# Patient Record
Sex: FEMALE | Race: WHITE | Employment: OTHER | URBAN - METROPOLITAN AREA
[De-identification: names, ages, dates, MRNs, and addresses within clinical notes are randomized per-mention and may not be internally consistent; named-entity substitution may affect disease eponyms.]

---

## 2019-01-01 ENCOUNTER — APPOINTMENT (OUTPATIENT)
Dept: ULTRASOUND IMAGING | Age: 74
DRG: 641 | End: 2019-01-01
Payer: MEDICARE

## 2019-01-01 ENCOUNTER — APPOINTMENT (OUTPATIENT)
Dept: CT IMAGING | Age: 74
DRG: 641 | End: 2019-01-01
Payer: MEDICARE

## 2019-01-01 ENCOUNTER — HOSPITAL ENCOUNTER (INPATIENT)
Age: 74
LOS: 5 days | Discharge: HOME HEALTH | DRG: 641 | End: 2019-05-09
Attending: EMERGENCY MEDICINE | Admitting: INTERNAL MEDICINE
Payer: MEDICARE

## 2019-01-01 ENCOUNTER — APPOINTMENT (OUTPATIENT)
Dept: VASCULAR LAB | Age: 74
DRG: 641 | End: 2019-01-01
Payer: MEDICARE

## 2019-01-01 ENCOUNTER — CARE COORDINATION (OUTPATIENT)
Dept: CASE MANAGEMENT | Age: 74
End: 2019-01-01

## 2019-01-01 ENCOUNTER — APPOINTMENT (OUTPATIENT)
Dept: GENERAL RADIOLOGY | Age: 74
DRG: 641 | End: 2019-01-01
Payer: MEDICARE

## 2019-01-01 VITALS
RESPIRATION RATE: 20 BRPM | HEIGHT: 64 IN | BODY MASS INDEX: 24.01 KG/M2 | TEMPERATURE: 98.2 F | HEART RATE: 96 BPM | OXYGEN SATURATION: 95 % | WEIGHT: 140.65 LBS | SYSTOLIC BLOOD PRESSURE: 160 MMHG | DIASTOLIC BLOOD PRESSURE: 71 MMHG

## 2019-01-01 DIAGNOSIS — R06.02 SOB (SHORTNESS OF BREATH): Primary | ICD-10-CM

## 2019-01-01 DIAGNOSIS — E87.70 HYPERVOLEMIA, UNSPECIFIED HYPERVOLEMIA TYPE: ICD-10-CM

## 2019-01-01 DIAGNOSIS — R19.5 HEME POSITIVE STOOL: ICD-10-CM

## 2019-01-01 LAB
ALBUMIN SERPL-MCNC: 3.3 G/DL (ref 3.1–4.9)
ALBUMIN SERPL-MCNC: 3.3 G/DL (ref 3.4–5)
ALBUMIN SERPL-MCNC: 3.5 G/DL (ref 3.4–5)
ALBUMIN SERPL-MCNC: 3.5 G/DL (ref 3.4–5)
ALBUMIN SERPL-MCNC: 3.7 G/DL (ref 3.4–5)
ALP BLD-CCNC: 72 U/L (ref 40–129)
ALPHA-1-GLOBULIN: 0.3 G/DL (ref 0.2–0.4)
ALPHA-2-GLOBULIN: 1 G/DL (ref 0.4–1.1)
ALT SERPL-CCNC: 14 U/L (ref 10–40)
ANION GAP SERPL CALCULATED.3IONS-SCNC: 13 MMOL/L (ref 3–16)
ANION GAP SERPL CALCULATED.3IONS-SCNC: 13 MMOL/L (ref 3–16)
ANION GAP SERPL CALCULATED.3IONS-SCNC: 14 MMOL/L (ref 3–16)
ANION GAP SERPL CALCULATED.3IONS-SCNC: 14 MMOL/L (ref 3–16)
ANION GAP SERPL CALCULATED.3IONS-SCNC: 15 MMOL/L (ref 3–16)
AST SERPL-CCNC: 22 U/L (ref 15–37)
BACTERIA: ABNORMAL /HPF
BASE EXCESS VENOUS: -7 (ref -3–3)
BASOPHILS ABSOLUTE: 0 K/UL (ref 0–0.2)
BASOPHILS ABSOLUTE: 0.1 K/UL (ref 0–0.2)
BASOPHILS ABSOLUTE: 0.1 K/UL (ref 0–0.2)
BASOPHILS RELATIVE PERCENT: 0.4 %
BASOPHILS RELATIVE PERCENT: 0.5 %
BASOPHILS RELATIVE PERCENT: 0.5 %
BASOPHILS RELATIVE PERCENT: 0.6 %
BASOPHILS RELATIVE PERCENT: 0.6 %
BETA GLOBULIN: 1.1 G/DL (ref 0.9–1.6)
BILIRUB SERPL-MCNC: <0.2 MG/DL (ref 0–1)
BILIRUBIN DIRECT: <0.2 MG/DL (ref 0–0.3)
BILIRUBIN URINE: NEGATIVE
BILIRUBIN, INDIRECT: ABNORMAL MG/DL (ref 0–1)
BLOOD, URINE: ABNORMAL
BUN BLDV-MCNC: 23 MG/DL (ref 7–20)
BUN BLDV-MCNC: 25 MG/DL (ref 7–20)
BUN BLDV-MCNC: 26 MG/DL (ref 7–20)
BUN BLDV-MCNC: 27 MG/DL (ref 7–20)
BUN BLDV-MCNC: 28 MG/DL (ref 7–20)
C DIFFICILE TOXIN, EIA: NORMAL
CALCIUM SERPL-MCNC: 9 MG/DL (ref 8.3–10.6)
CALCIUM SERPL-MCNC: 9.2 MG/DL (ref 8.3–10.6)
CALCIUM SERPL-MCNC: 9.2 MG/DL (ref 8.3–10.6)
CALCIUM SERPL-MCNC: 9.5 MG/DL (ref 8.3–10.6)
CALCIUM SERPL-MCNC: 9.7 MG/DL (ref 8.3–10.6)
CHLORIDE BLD-SCNC: 107 MMOL/L (ref 99–110)
CHLORIDE BLD-SCNC: 108 MMOL/L (ref 99–110)
CHLORIDE BLD-SCNC: 108 MMOL/L (ref 99–110)
CHLORIDE BLD-SCNC: 109 MMOL/L (ref 99–110)
CHLORIDE BLD-SCNC: 109 MMOL/L (ref 99–110)
CHOLESTEROL, TOTAL: 153 MG/DL (ref 0–199)
CLARITY: CLEAR
CO2: 18 MMOL/L (ref 21–32)
CO2: 20 MMOL/L (ref 21–32)
CO2: 21 MMOL/L (ref 21–32)
CO2: 22 MMOL/L (ref 21–32)
CO2: 22 MMOL/L (ref 21–32)
COLOR: YELLOW
CORTISOL TOTAL: 17.4 UG/DL
CREAT SERPL-MCNC: 1.9 MG/DL (ref 0.6–1.2)
CREAT SERPL-MCNC: 2 MG/DL (ref 0.6–1.2)
CREATININE URINE: 90.7 MG/DL (ref 28–259)
EKG ATRIAL RATE: 85 BPM
EKG DIAGNOSIS: NORMAL
EKG P AXIS: 65 DEGREES
EKG P-R INTERVAL: 146 MS
EKG Q-T INTERVAL: 378 MS
EKG QRS DURATION: 82 MS
EKG QTC CALCULATION (BAZETT): 449 MS
EKG R AXIS: 60 DEGREES
EKG T AXIS: 23 DEGREES
EKG VENTRICULAR RATE: 85 BPM
EOSINOPHILS ABSOLUTE: 0.1 K/UL (ref 0–0.6)
EOSINOPHILS ABSOLUTE: 0.2 K/UL (ref 0–0.6)
EOSINOPHILS ABSOLUTE: 0.3 K/UL (ref 0–0.6)
EOSINOPHILS RELATIVE PERCENT: 0.8 %
EOSINOPHILS RELATIVE PERCENT: 1.2 %
EOSINOPHILS RELATIVE PERCENT: 1.8 %
EOSINOPHILS RELATIVE PERCENT: 1.8 %
EOSINOPHILS RELATIVE PERCENT: 3.3 %
EPITHELIAL CELLS, UA: ABNORMAL /HPF
FERRITIN: 63.2 NG/ML (ref 15–150)
GAMMA GLOBULIN: 0.4 G/DL (ref 0.6–1.8)
GFR AFRICAN AMERICAN: 29
GFR AFRICAN AMERICAN: 31
GFR NON-AFRICAN AMERICAN: 24
GFR NON-AFRICAN AMERICAN: 26
GLUCOSE BLD-MCNC: 102 MG/DL (ref 70–99)
GLUCOSE BLD-MCNC: 103 MG/DL (ref 70–99)
GLUCOSE BLD-MCNC: 108 MG/DL (ref 70–99)
GLUCOSE BLD-MCNC: 137 MG/DL (ref 70–99)
GLUCOSE BLD-MCNC: 97 MG/DL (ref 70–99)
GLUCOSE URINE: 100 MG/DL
HCO3 VENOUS: 18.2 MMOL/L (ref 23–29)
HCT VFR BLD CALC: 25.6 % (ref 36–48)
HCT VFR BLD CALC: 26.6 % (ref 36–48)
HCT VFR BLD CALC: 26.7 % (ref 36–48)
HCT VFR BLD CALC: 29.4 % (ref 36–48)
HCT VFR BLD CALC: 30.2 % (ref 36–48)
HCT VFR BLD CALC: 35.2 %
HDLC SERPL-MCNC: 57 MG/DL (ref 40–60)
HEMOGLOBIN: 8.4 G/DL (ref 12–16)
HEMOGLOBIN: 8.7 G/DL (ref 12–16)
HEMOGLOBIN: 8.7 G/DL (ref 12–16)
HEMOGLOBIN: 9.5 G/DL (ref 12–16)
HEMOGLOBIN: 9.8 G/DL (ref 12–16)
IRON SATURATION: 25 % (ref 15–50)
IRON: 64 UG/DL (ref 37–145)
KETONES, URINE: NEGATIVE MG/DL
LACTATE: 0.78 MMOL/L (ref 0.4–2)
LDL CHOLESTEROL CALCULATED: 69 MG/DL
LEUKOCYTE ESTERASE, URINE: NEGATIVE
LIPASE: 34 U/L (ref 13–60)
LV EF: 58 %
LVEF MODALITY: NORMAL
LYMPHOCYTES ABSOLUTE: 1.3 K/UL (ref 1–5.1)
LYMPHOCYTES ABSOLUTE: 1.8 K/UL (ref 1–5.1)
LYMPHOCYTES ABSOLUTE: 2 K/UL (ref 1–5.1)
LYMPHOCYTES ABSOLUTE: 2.2 K/UL (ref 1–5.1)
LYMPHOCYTES ABSOLUTE: 2.7 K/UL (ref 1–5.1)
LYMPHOCYTES RELATIVE PERCENT: 11.8 %
LYMPHOCYTES RELATIVE PERCENT: 20.5 %
LYMPHOCYTES RELATIVE PERCENT: 21.5 %
LYMPHOCYTES RELATIVE PERCENT: 24.2 %
LYMPHOCYTES RELATIVE PERCENT: 25.7 %
MAGNESIUM: 1.8 MG/DL (ref 1.8–2.4)
MAGNESIUM: 1.8 MG/DL (ref 1.8–2.4)
MAGNESIUM: 2 MG/DL (ref 1.8–2.4)
MCH RBC QN AUTO: 32 PG (ref 26–34)
MCH RBC QN AUTO: 32.1 PG (ref 26–34)
MCH RBC QN AUTO: 32.4 PG (ref 26–34)
MCH RBC QN AUTO: 32.6 PG (ref 26–34)
MCH RBC QN AUTO: 32.6 PG (ref 26–34)
MCHC RBC AUTO-ENTMCNC: 32.3 G/DL (ref 31–36)
MCHC RBC AUTO-ENTMCNC: 32.3 G/DL (ref 31–36)
MCHC RBC AUTO-ENTMCNC: 32.5 G/DL (ref 31–36)
MCHC RBC AUTO-ENTMCNC: 32.7 G/DL (ref 31–36)
MCHC RBC AUTO-ENTMCNC: 32.8 G/DL (ref 31–36)
MCV RBC AUTO: 101 FL (ref 80–100)
MCV RBC AUTO: 98.1 FL (ref 80–100)
MCV RBC AUTO: 99.3 FL (ref 80–100)
MCV RBC AUTO: 99.4 FL (ref 80–100)
MCV RBC AUTO: 99.5 FL (ref 80–100)
MICROSCOPIC EXAMINATION: YES
MONOCYTES ABSOLUTE: 0.4 K/UL (ref 0–1.3)
MONOCYTES ABSOLUTE: 0.8 K/UL (ref 0–1.3)
MONOCYTES ABSOLUTE: 0.8 K/UL (ref 0–1.3)
MONOCYTES ABSOLUTE: 0.9 K/UL (ref 0–1.3)
MONOCYTES ABSOLUTE: 1 K/UL (ref 0–1.3)
MONOCYTES RELATIVE PERCENT: 10.2 %
MONOCYTES RELATIVE PERCENT: 11.4 %
MONOCYTES RELATIVE PERCENT: 4 %
MONOCYTES RELATIVE PERCENT: 7.9 %
MONOCYTES RELATIVE PERCENT: 8.5 %
NEUTROPHILS ABSOLUTE: 4.6 K/UL (ref 1.7–7.7)
NEUTROPHILS ABSOLUTE: 4.7 K/UL (ref 1.7–7.7)
NEUTROPHILS ABSOLUTE: 6.8 K/UL (ref 1.7–7.7)
NEUTROPHILS ABSOLUTE: 9.1 K/UL (ref 1.7–7.7)
NEUTROPHILS ABSOLUTE: 9.1 K/UL (ref 1.7–7.7)
NEUTROPHILS RELATIVE PERCENT: 61.7 %
NEUTROPHILS RELATIVE PERCENT: 62.7 %
NEUTROPHILS RELATIVE PERCENT: 66.1 %
NEUTROPHILS RELATIVE PERCENT: 69.3 %
NEUTROPHILS RELATIVE PERCENT: 83 %
NITRITE, URINE: NEGATIVE
O2 SAT, VEN: 90 %
PCO2, VEN: 30.8 MM HG (ref 40–50)
PDW BLD-RTO: 13.9 % (ref 12.4–15.4)
PDW BLD-RTO: 14 % (ref 12.4–15.4)
PDW BLD-RTO: 14.1 % (ref 12.4–15.4)
PDW BLD-RTO: 14.2 % (ref 12.4–15.4)
PDW BLD-RTO: 14.5 % (ref 12.4–15.4)
PERFORMED ON: ABNORMAL
PH UA: 6.5 (ref 5–8)
PH VENOUS: 7.38 (ref 7.35–7.45)
PHOSPHORUS: 2.3 MG/DL (ref 2.5–4.9)
PHOSPHORUS: 2.9 MG/DL (ref 2.5–4.9)
PHOSPHORUS: 3.5 MG/DL (ref 2.5–4.9)
PLATELET # BLD: 221 K/UL (ref 135–450)
PLATELET # BLD: 232 K/UL (ref 135–450)
PLATELET # BLD: 245 K/UL (ref 135–450)
PLATELET # BLD: 279 K/UL (ref 135–450)
PLATELET # BLD: 279 K/UL (ref 135–450)
PMV BLD AUTO: 7.7 FL (ref 5–10.5)
PMV BLD AUTO: 7.8 FL (ref 5–10.5)
PMV BLD AUTO: 7.9 FL (ref 5–10.5)
PMV BLD AUTO: 8 FL (ref 5–10.5)
PMV BLD AUTO: 8.3 FL (ref 5–10.5)
PO2, VEN: 59 MM HG
POC OCCULT BLOOD STOOL: POSITIVE
POC SAMPLE TYPE: ABNORMAL
POTASSIUM REFLEX MAGNESIUM: 3.3 MMOL/L (ref 3.5–5.1)
POTASSIUM SERPL-SCNC: 3 MMOL/L (ref 3.5–5.1)
POTASSIUM SERPL-SCNC: 3.2 MMOL/L (ref 3.5–5.1)
POTASSIUM SERPL-SCNC: 3.3 MMOL/L (ref 3.5–5.1)
POTASSIUM SERPL-SCNC: 3.6 MMOL/L (ref 3.5–5.1)
POTASSIUM SERPL-SCNC: 4.1 MMOL/L (ref 3.5–5.1)
PRO-BNP: 925 PG/ML (ref 0–449)
PROTEIN PROTEIN: 92.4 MG/DL
PROTEIN UA: 100 MG/DL
RAPID INFLUENZA  B AGN: NEGATIVE
RAPID INFLUENZA A AGN: NEGATIVE
RBC # BLD: 2.61 M/UL (ref 4–5.2)
RBC # BLD: 2.68 M/UL (ref 4–5.2)
RBC # BLD: 2.68 M/UL (ref 4–5.2)
RBC # BLD: 2.95 M/UL (ref 4–5.2)
RBC # BLD: 2.99 M/UL (ref 4–5.2)
RBC FOLATE: 601 NG/ML
RBC UA: ABNORMAL /HPF (ref 0–2)
SODIUM BLD-SCNC: 141 MMOL/L (ref 136–145)
SODIUM BLD-SCNC: 142 MMOL/L (ref 136–145)
SODIUM BLD-SCNC: 143 MMOL/L (ref 136–145)
SODIUM BLD-SCNC: 143 MMOL/L (ref 136–145)
SODIUM BLD-SCNC: 144 MMOL/L (ref 136–145)
SPE/IFE INTERPRETATION: NORMAL
SPECIFIC GRAVITY UA: 1.02 (ref 1–1.03)
TCO2 CALC VENOUS: 19 MMOL/L
TOTAL IRON BINDING CAPACITY: 257 UG/DL (ref 260–445)
TOTAL PROTEIN: 5.9 G/DL (ref 6.4–8.2)
TOTAL PROTEIN: 6.1 G/DL (ref 6.4–8.2)
TRANSFERRIN: 207 MG/DL (ref 200–360)
TRIGL SERPL-MCNC: 135 MG/DL (ref 0–150)
TROPONIN: 0.01 NG/ML
URINE REFLEX TO CULTURE: ABNORMAL
URINE TYPE: ABNORMAL
UROBILINOGEN, URINE: 0.2 E.U./DL
VITAMIN B-12: 880 PG/ML (ref 211–911)
VLDLC SERPL CALC-MCNC: 27 MG/DL
WBC # BLD: 10.2 K/UL (ref 4–11)
WBC # BLD: 11 K/UL (ref 4–11)
WBC # BLD: 13.1 K/UL (ref 4–11)
WBC # BLD: 7.4 K/UL (ref 4–11)
WBC # BLD: 7.7 K/UL (ref 4–11)
WBC UA: ABNORMAL /HPF (ref 0–5)

## 2019-01-01 PROCEDURE — 83690 ASSAY OF LIPASE: CPT

## 2019-01-01 PROCEDURE — 6370000000 HC RX 637 (ALT 250 FOR IP): Performed by: INTERNAL MEDICINE

## 2019-01-01 PROCEDURE — 84165 PROTEIN E-PHORESIS SERUM: CPT

## 2019-01-01 PROCEDURE — 36415 COLL VENOUS BLD VENIPUNCTURE: CPT

## 2019-01-01 PROCEDURE — 87449 NOS EACH ORGANISM AG IA: CPT

## 2019-01-01 PROCEDURE — 6370000000 HC RX 637 (ALT 250 FOR IP)

## 2019-01-01 PROCEDURE — 83735 ASSAY OF MAGNESIUM: CPT

## 2019-01-01 PROCEDURE — 94761 N-INVAS EAR/PLS OXIMETRY MLT: CPT

## 2019-01-01 PROCEDURE — 6370000000 HC RX 637 (ALT 250 FOR IP): Performed by: PHYSICIAN ASSISTANT

## 2019-01-01 PROCEDURE — 83605 ASSAY OF LACTIC ACID: CPT

## 2019-01-01 PROCEDURE — 99222 1ST HOSP IP/OBS MODERATE 55: CPT | Performed by: INTERNAL MEDICINE

## 2019-01-01 PROCEDURE — 97116 GAIT TRAINING THERAPY: CPT

## 2019-01-01 PROCEDURE — 6360000002 HC RX W HCPCS: Performed by: INTERNAL MEDICINE

## 2019-01-01 PROCEDURE — 71045 X-RAY EXAM CHEST 1 VIEW: CPT

## 2019-01-01 PROCEDURE — 85025 COMPLETE CBC W/AUTO DIFF WBC: CPT

## 2019-01-01 PROCEDURE — 89220 SPUTUM SPECIMEN COLLECTION: CPT

## 2019-01-01 PROCEDURE — 99285 EMERGENCY DEPT VISIT HI MDM: CPT

## 2019-01-01 PROCEDURE — 84132 ASSAY OF SERUM POTASSIUM: CPT

## 2019-01-01 PROCEDURE — 82803 BLOOD GASES ANY COMBINATION: CPT

## 2019-01-01 PROCEDURE — 70450 CT HEAD/BRAIN W/O DYE: CPT

## 2019-01-01 PROCEDURE — 1200000000 HC SEMI PRIVATE

## 2019-01-01 PROCEDURE — 6360000002 HC RX W HCPCS: Performed by: STUDENT IN AN ORGANIZED HEALTH CARE EDUCATION/TRAINING PROGRAM

## 2019-01-01 PROCEDURE — 76770 US EXAM ABDO BACK WALL COMP: CPT

## 2019-01-01 PROCEDURE — 94664 DEMO&/EVAL PT USE INHALER: CPT

## 2019-01-01 PROCEDURE — 94640 AIRWAY INHALATION TREATMENT: CPT

## 2019-01-01 PROCEDURE — 2580000003 HC RX 258: Performed by: INTERNAL MEDICINE

## 2019-01-01 PROCEDURE — 93306 TTE W/DOPPLER COMPLETE: CPT

## 2019-01-01 PROCEDURE — 87804 INFLUENZA ASSAY W/OPTIC: CPT

## 2019-01-01 PROCEDURE — 93005 ELECTROCARDIOGRAM TRACING: CPT | Performed by: PHYSICIAN ASSISTANT

## 2019-01-01 PROCEDURE — 83540 ASSAY OF IRON: CPT

## 2019-01-01 PROCEDURE — 97535 SELF CARE MNGMENT TRAINING: CPT

## 2019-01-01 PROCEDURE — 80069 RENAL FUNCTION PANEL: CPT

## 2019-01-01 PROCEDURE — 1111F DSCHRG MED/CURRENT MED MERGE: CPT | Performed by: INTERNAL MEDICINE

## 2019-01-01 PROCEDURE — 84156 ASSAY OF PROTEIN URINE: CPT

## 2019-01-01 PROCEDURE — 84466 ASSAY OF TRANSFERRIN: CPT

## 2019-01-01 PROCEDURE — 97530 THERAPEUTIC ACTIVITIES: CPT

## 2019-01-01 PROCEDURE — 82272 OCCULT BLD FECES 1-3 TESTS: CPT

## 2019-01-01 PROCEDURE — 80061 LIPID PANEL: CPT

## 2019-01-01 PROCEDURE — 81001 URINALYSIS AUTO W/SCOPE: CPT

## 2019-01-01 PROCEDURE — 97162 PT EVAL MOD COMPLEX 30 MIN: CPT

## 2019-01-01 PROCEDURE — 83880 ASSAY OF NATRIURETIC PEPTIDE: CPT

## 2019-01-01 PROCEDURE — 74150 CT ABDOMEN W/O CONTRAST: CPT

## 2019-01-01 PROCEDURE — 6370000000 HC RX 637 (ALT 250 FOR IP): Performed by: STUDENT IN AN ORGANIZED HEALTH CARE EDUCATION/TRAINING PROGRAM

## 2019-01-01 PROCEDURE — 82728 ASSAY OF FERRITIN: CPT

## 2019-01-01 PROCEDURE — 93970 EXTREMITY STUDY: CPT

## 2019-01-01 PROCEDURE — 84484 ASSAY OF TROPONIN QUANT: CPT

## 2019-01-01 PROCEDURE — 51798 US URINE CAPACITY MEASURE: CPT

## 2019-01-01 PROCEDURE — 82607 VITAMIN B-12: CPT

## 2019-01-01 PROCEDURE — 97165 OT EVAL LOW COMPLEX 30 MIN: CPT

## 2019-01-01 PROCEDURE — 80048 BASIC METABOLIC PNL TOTAL CA: CPT

## 2019-01-01 PROCEDURE — 82747 ASSAY OF FOLIC ACID RBC: CPT

## 2019-01-01 PROCEDURE — 87324 CLOSTRIDIUM AG IA: CPT

## 2019-01-01 PROCEDURE — 82570 ASSAY OF URINE CREATININE: CPT

## 2019-01-01 PROCEDURE — 99232 SBSQ HOSP IP/OBS MODERATE 35: CPT | Performed by: INTERNAL MEDICINE

## 2019-01-01 PROCEDURE — 84155 ASSAY OF PROTEIN SERUM: CPT

## 2019-01-01 PROCEDURE — 80076 HEPATIC FUNCTION PANEL: CPT

## 2019-01-01 PROCEDURE — 82533 TOTAL CORTISOL: CPT

## 2019-01-01 RX ORDER — NIFEDIPINE 30 MG/1
30 TABLET, FILM COATED, EXTENDED RELEASE ORAL DAILY
Status: DISCONTINUED | OUTPATIENT
Start: 2019-01-01 | End: 2019-01-01

## 2019-01-01 RX ORDER — ACETAMINOPHEN 500 MG
1000 TABLET ORAL ONCE
Status: COMPLETED | OUTPATIENT
Start: 2019-01-01 | End: 2019-01-01

## 2019-01-01 RX ORDER — CHLORTHALIDONE 25 MG/1
25 TABLET ORAL DAILY
Qty: 30 TABLET | Refills: 3 | Status: SHIPPED | OUTPATIENT
Start: 2019-01-01

## 2019-01-01 RX ORDER — POTASSIUM CHLORIDE 750 MG/1
10 TABLET, EXTENDED RELEASE ORAL 2 TIMES DAILY
Status: DISCONTINUED | OUTPATIENT
Start: 2019-01-01 | End: 2019-01-01 | Stop reason: HOSPADM

## 2019-01-01 RX ORDER — SODIUM CHLORIDE 0.9 % (FLUSH) 0.9 %
10 SYRINGE (ML) INJECTION EVERY 12 HOURS SCHEDULED
Status: DISCONTINUED | OUTPATIENT
Start: 2019-01-01 | End: 2019-01-01 | Stop reason: HOSPADM

## 2019-01-01 RX ORDER — LEVOTHYROXINE SODIUM 112 UG/1
112 TABLET ORAL DAILY
COMMUNITY
Start: 2019-01-01

## 2019-01-01 RX ORDER — NIFEDIPINE 30 MG/1
30 TABLET, FILM COATED, EXTENDED RELEASE ORAL ONCE
Status: DISCONTINUED | OUTPATIENT
Start: 2019-01-01 | End: 2019-01-01 | Stop reason: HOSPADM

## 2019-01-01 RX ORDER — FERROUS SULFATE 325(65) MG
325 TABLET ORAL
Qty: 30 TABLET | Refills: 3 | Status: SHIPPED | OUTPATIENT
Start: 2019-01-01

## 2019-01-01 RX ORDER — NIFEDIPINE 60 MG/1
60 TABLET, FILM COATED, EXTENDED RELEASE ORAL 2 TIMES DAILY
Qty: 30 TABLET | Refills: 3 | Status: SHIPPED | OUTPATIENT
Start: 2019-01-01

## 2019-01-01 RX ORDER — ONDANSETRON 2 MG/ML
4 INJECTION INTRAMUSCULAR; INTRAVENOUS EVERY 6 HOURS PRN
Status: DISCONTINUED | OUTPATIENT
Start: 2019-01-01 | End: 2019-01-01 | Stop reason: HOSPADM

## 2019-01-01 RX ORDER — FUROSEMIDE 40 MG/1
40 TABLET ORAL DAILY
Qty: 60 TABLET | Refills: 3 | Status: SHIPPED | OUTPATIENT
Start: 2019-01-01

## 2019-01-01 RX ORDER — FUROSEMIDE 40 MG/1
40 TABLET ORAL 2 TIMES DAILY
Status: DISCONTINUED | OUTPATIENT
Start: 2019-01-01 | End: 2019-01-01 | Stop reason: HOSPADM

## 2019-01-01 RX ORDER — ROSUVASTATIN CALCIUM 10 MG/1
10 TABLET, COATED ORAL NIGHTLY
Status: DISCONTINUED | OUTPATIENT
Start: 2019-01-01 | End: 2019-01-01 | Stop reason: HOSPADM

## 2019-01-01 RX ORDER — BUDESONIDE 0.5 MG/2ML
0.5 INHALANT ORAL 2 TIMES DAILY
Status: DISCONTINUED | OUTPATIENT
Start: 2019-01-01 | End: 2019-01-01 | Stop reason: HOSPADM

## 2019-01-01 RX ORDER — NIFEDIPINE 30 MG/1
30 TABLET, FILM COATED, EXTENDED RELEASE ORAL
Status: ON HOLD | COMMUNITY
Start: 2018-03-06 | End: 2019-01-01 | Stop reason: HOSPADM

## 2019-01-01 RX ORDER — SODIUM CHLORIDE 0.9 % (FLUSH) 0.9 %
10 SYRINGE (ML) INJECTION PRN
Status: DISCONTINUED | OUTPATIENT
Start: 2019-01-01 | End: 2019-01-01 | Stop reason: HOSPADM

## 2019-01-01 RX ORDER — IPRATROPIUM BROMIDE AND ALBUTEROL SULFATE 2.5; .5 MG/3ML; MG/3ML
SOLUTION RESPIRATORY (INHALATION)
Status: DISPENSED
Start: 2019-01-01 | End: 2019-01-01

## 2019-01-01 RX ORDER — FUROSEMIDE 10 MG/ML
20 INJECTION INTRAMUSCULAR; INTRAVENOUS 2 TIMES DAILY
Status: DISCONTINUED | OUTPATIENT
Start: 2019-01-01 | End: 2019-01-01

## 2019-01-01 RX ORDER — BUTALBITAL, ACETAMINOPHEN AND CAFFEINE 50; 325; 40 MG/1; MG/1; MG/1
1 TABLET ORAL ONCE
Status: COMPLETED | OUTPATIENT
Start: 2019-01-01 | End: 2019-01-01

## 2019-01-01 RX ORDER — CHLORTHALIDONE 25 MG/1
25 TABLET ORAL DAILY
Status: DISCONTINUED | OUTPATIENT
Start: 2019-01-01 | End: 2019-01-01 | Stop reason: HOSPADM

## 2019-01-01 RX ORDER — SERTRALINE HYDROCHLORIDE 100 MG/1
50 TABLET, FILM COATED ORAL DAILY
Status: ON HOLD | COMMUNITY
Start: 2016-05-25 | End: 2019-01-01 | Stop reason: HOSPADM

## 2019-01-01 RX ORDER — NIFEDIPINE 30 MG/1
30 TABLET, FILM COATED, EXTENDED RELEASE ORAL 2 TIMES DAILY
Status: DISCONTINUED | OUTPATIENT
Start: 2019-01-01 | End: 2019-01-01

## 2019-01-01 RX ORDER — LOSARTAN POTASSIUM 100 MG/1
100 TABLET ORAL DAILY
Qty: 30 TABLET | Refills: 3 | Status: SHIPPED | OUTPATIENT
Start: 2019-01-01

## 2019-01-01 RX ORDER — ROSUVASTATIN CALCIUM 10 MG/1
1 TABLET, COATED ORAL NIGHTLY
COMMUNITY
Start: 2016-04-25

## 2019-01-01 RX ORDER — BUDESONIDE AND FORMOTEROL FUMARATE DIHYDRATE 160; 4.5 UG/1; UG/1
2 AEROSOL RESPIRATORY (INHALATION) 2 TIMES DAILY
Qty: 1 INHALER | Refills: 3 | Status: SHIPPED | OUTPATIENT
Start: 2019-01-01

## 2019-01-01 RX ORDER — SENNA PLUS 8.6 MG/1
1 TABLET ORAL DAILY PRN
Status: DISCONTINUED | OUTPATIENT
Start: 2019-01-01 | End: 2019-01-01 | Stop reason: HOSPADM

## 2019-01-01 RX ORDER — POTASSIUM CHLORIDE 750 MG/1
20 TABLET, EXTENDED RELEASE ORAL DAILY PRN
Qty: 60 TABLET | Refills: 3 | Status: SHIPPED | OUTPATIENT
Start: 2019-01-01

## 2019-01-01 RX ORDER — NIFEDIPINE 60 MG/1
60 TABLET, EXTENDED RELEASE ORAL 2 TIMES DAILY
Status: DISCONTINUED | OUTPATIENT
Start: 2019-01-01 | End: 2019-01-01 | Stop reason: HOSPADM

## 2019-01-01 RX ORDER — ACETAMINOPHEN 325 MG/1
650 TABLET ORAL EVERY 4 HOURS PRN
Status: DISCONTINUED | OUTPATIENT
Start: 2019-01-01 | End: 2019-01-01 | Stop reason: HOSPADM

## 2019-01-01 RX ORDER — LEVOTHYROXINE SODIUM 112 UG/1
112 TABLET ORAL DAILY
Status: DISCONTINUED | OUTPATIENT
Start: 2019-01-01 | End: 2019-01-01 | Stop reason: HOSPADM

## 2019-01-01 RX ORDER — IPRATROPIUM BROMIDE AND ALBUTEROL SULFATE 2.5; .5 MG/3ML; MG/3ML
1 SOLUTION RESPIRATORY (INHALATION) 3 TIMES DAILY
Status: DISCONTINUED | OUTPATIENT
Start: 2019-01-01 | End: 2019-01-01 | Stop reason: HOSPADM

## 2019-01-01 RX ORDER — POTASSIUM CHLORIDE 20 MEQ/1
40 TABLET, EXTENDED RELEASE ORAL
Status: COMPLETED | OUTPATIENT
Start: 2019-01-01 | End: 2019-01-01

## 2019-01-01 RX ORDER — FUROSEMIDE 10 MG/ML
20 INJECTION INTRAMUSCULAR; INTRAVENOUS ONCE
Status: COMPLETED | OUTPATIENT
Start: 2019-01-01 | End: 2019-01-01

## 2019-01-01 RX ORDER — IPRATROPIUM BROMIDE AND ALBUTEROL SULFATE 2.5; .5 MG/3ML; MG/3ML
1 SOLUTION RESPIRATORY (INHALATION)
Status: DISCONTINUED | OUTPATIENT
Start: 2019-01-01 | End: 2019-01-01

## 2019-01-01 RX ORDER — LOSARTAN POTASSIUM 100 MG/1
100 TABLET ORAL DAILY
Status: DISCONTINUED | OUTPATIENT
Start: 2019-01-01 | End: 2019-01-01 | Stop reason: HOSPADM

## 2019-01-01 RX ORDER — ALBUTEROL SULFATE 90 UG/1
2 AEROSOL, METERED RESPIRATORY (INHALATION)
COMMUNITY
Start: 2018-04-18

## 2019-01-01 RX ORDER — FORMOTEROL FUMARATE 20 UG/2ML
20 SOLUTION RESPIRATORY (INHALATION) 2 TIMES DAILY
Status: DISCONTINUED | OUTPATIENT
Start: 2019-01-01 | End: 2019-01-01 | Stop reason: HOSPADM

## 2019-01-01 RX ORDER — SENNA PLUS 8.6 MG/1
1 TABLET ORAL DAILY PRN
Qty: 30 TABLET | Refills: 1 | Status: SHIPPED | OUTPATIENT
Start: 2019-01-01 | End: 2019-01-01

## 2019-01-01 RX ADMIN — NIFEDIPINE 60 MG: 60 TABLET, FILM COATED, EXTENDED RELEASE ORAL at 09:40

## 2019-01-01 RX ADMIN — Medication 10 ML: at 09:41

## 2019-01-01 RX ADMIN — FORMOTEROL FUMARATE DIHYDRATE 20 MCG: 20 SOLUTION RESPIRATORY (INHALATION) at 08:52

## 2019-01-01 RX ADMIN — IPRATROPIUM BROMIDE AND ALBUTEROL SULFATE 1 AMPULE: .5; 3 SOLUTION RESPIRATORY (INHALATION) at 20:21

## 2019-01-01 RX ADMIN — IPRATROPIUM BROMIDE AND ALBUTEROL SULFATE 1 AMPULE: .5; 3 SOLUTION RESPIRATORY (INHALATION) at 08:35

## 2019-01-01 RX ADMIN — POTASSIUM CHLORIDE 10 MEQ: 10 TABLET, EXTENDED RELEASE ORAL at 22:06

## 2019-01-01 RX ADMIN — LOSARTAN POTASSIUM 100 MG: 100 TABLET ORAL at 08:54

## 2019-01-01 RX ADMIN — Medication 10 ML: at 21:30

## 2019-01-01 RX ADMIN — NIFEDIPINE 60 MG: 60 TABLET, FILM COATED, EXTENDED RELEASE ORAL at 20:41

## 2019-01-01 RX ADMIN — NIFEDIPINE 60 MG: 60 TABLET, FILM COATED, EXTENDED RELEASE ORAL at 08:19

## 2019-01-01 RX ADMIN — FUROSEMIDE 20 MG: 10 INJECTION, SOLUTION INTRAVENOUS at 20:46

## 2019-01-01 RX ADMIN — ACETAMINOPHEN 650 MG: 325 TABLET ORAL at 04:26

## 2019-01-01 RX ADMIN — ONDANSETRON 4 MG: 2 INJECTION INTRAMUSCULAR; INTRAVENOUS at 21:11

## 2019-01-01 RX ADMIN — NIFEDIPINE 60 MG: 60 TABLET, FILM COATED, EXTENDED RELEASE ORAL at 21:44

## 2019-01-01 RX ADMIN — Medication 10 ML: at 20:49

## 2019-01-01 RX ADMIN — ACETAMINOPHEN 1000 MG: 500 TABLET, FILM COATED ORAL at 17:12

## 2019-01-01 RX ADMIN — POTASSIUM CHLORIDE 10 MEQ: 10 TABLET, EXTENDED RELEASE ORAL at 08:59

## 2019-01-01 RX ADMIN — ACETAMINOPHEN 650 MG: 325 TABLET ORAL at 06:50

## 2019-01-01 RX ADMIN — FORMOTEROL FUMARATE DIHYDRATE 20 MCG: 20 SOLUTION RESPIRATORY (INHALATION) at 20:41

## 2019-01-01 RX ADMIN — HYDROMORPHONE HYDROCHLORIDE 0.5 MG: 1 INJECTION, SOLUTION INTRAMUSCULAR; INTRAVENOUS; SUBCUTANEOUS at 20:46

## 2019-01-01 RX ADMIN — FUROSEMIDE 20 MG: 10 INJECTION, SOLUTION INTRAMUSCULAR; INTRAVENOUS at 08:19

## 2019-01-01 RX ADMIN — ACETAMINOPHEN 650 MG: 325 TABLET ORAL at 21:44

## 2019-01-01 RX ADMIN — IPRATROPIUM BROMIDE AND ALBUTEROL SULFATE 1 AMPULE: .5; 3 SOLUTION RESPIRATORY (INHALATION) at 08:27

## 2019-01-01 RX ADMIN — FUROSEMIDE 20 MG: 10 INJECTION, SOLUTION INTRAMUSCULAR; INTRAVENOUS at 09:41

## 2019-01-01 RX ADMIN — ACETAMINOPHEN 650 MG: 325 TABLET ORAL at 06:08

## 2019-01-01 RX ADMIN — SERTRALINE HYDROCHLORIDE 50 MG: 50 TABLET ORAL at 09:39

## 2019-01-01 RX ADMIN — CHLORTHALIDONE 25 MG: 25 TABLET ORAL at 09:32

## 2019-01-01 RX ADMIN — Medication 10 ML: at 08:54

## 2019-01-01 RX ADMIN — IPRATROPIUM BROMIDE AND ALBUTEROL SULFATE 1 AMPULE: .5; 3 SOLUTION RESPIRATORY (INHALATION) at 17:32

## 2019-01-01 RX ADMIN — ACETAMINOPHEN 650 MG: 325 TABLET ORAL at 11:02

## 2019-01-01 RX ADMIN — IRON SUCROSE 200 MG: 20 INJECTION, SOLUTION INTRAVENOUS at 18:14

## 2019-01-01 RX ADMIN — LEVOTHYROXINE SODIUM 112 MCG: 112 TABLET ORAL at 08:02

## 2019-01-01 RX ADMIN — IPRATROPIUM BROMIDE AND ALBUTEROL SULFATE 1 AMPULE: .5; 3 SOLUTION RESPIRATORY (INHALATION) at 13:16

## 2019-01-01 RX ADMIN — LEVOTHYROXINE SODIUM 112 MCG: 112 TABLET ORAL at 06:22

## 2019-01-01 RX ADMIN — BUTALBITAL, ACETAMINOPHEN, AND CAFFEINE 1 TABLET: 50; 325; 40 TABLET ORAL at 23:30

## 2019-01-01 RX ADMIN — BUDESONIDE 500 MCG: 0.5 SUSPENSION RESPIRATORY (INHALATION) at 08:52

## 2019-01-01 RX ADMIN — ROSUVASTATIN CALCIUM 10 MG: 10 TABLET, COATED ORAL at 21:32

## 2019-01-01 RX ADMIN — IPRATROPIUM BROMIDE AND ALBUTEROL SULFATE 1 AMPULE: .5; 3 SOLUTION RESPIRATORY (INHALATION) at 20:40

## 2019-01-01 RX ADMIN — NIFEDIPINE 60 MG: 60 TABLET, FILM COATED, EXTENDED RELEASE ORAL at 08:58

## 2019-01-01 RX ADMIN — ACETAMINOPHEN 650 MG: 325 TABLET ORAL at 07:08

## 2019-01-01 RX ADMIN — SERTRALINE HYDROCHLORIDE 50 MG: 50 TABLET ORAL at 08:54

## 2019-01-01 RX ADMIN — LOSARTAN POTASSIUM 100 MG: 100 TABLET ORAL at 08:03

## 2019-01-01 RX ADMIN — Medication 10 ML: at 21:11

## 2019-01-01 RX ADMIN — ACETAMINOPHEN 650 MG: 325 TABLET ORAL at 14:30

## 2019-01-01 RX ADMIN — POTASSIUM CHLORIDE 40 MEQ: 20 TABLET, EXTENDED RELEASE ORAL at 09:39

## 2019-01-01 RX ADMIN — LOSARTAN POTASSIUM 100 MG: 100 TABLET ORAL at 08:59

## 2019-01-01 RX ADMIN — LOSARTAN POTASSIUM 100 MG: 100 TABLET ORAL at 09:49

## 2019-01-01 RX ADMIN — LEVOTHYROXINE SODIUM 112 MCG: 112 TABLET ORAL at 06:50

## 2019-01-01 RX ADMIN — ROSUVASTATIN CALCIUM 10 MG: 10 TABLET, COATED ORAL at 21:11

## 2019-01-01 RX ADMIN — POTASSIUM CHLORIDE 40 MEQ: 20 TABLET, EXTENDED RELEASE ORAL at 13:08

## 2019-01-01 RX ADMIN — CHLORTHALIDONE 25 MG: 25 TABLET ORAL at 08:59

## 2019-01-01 RX ADMIN — ACETAMINOPHEN 650 MG: 325 TABLET ORAL at 15:30

## 2019-01-01 RX ADMIN — ACETAMINOPHEN 650 MG: 325 TABLET ORAL at 17:39

## 2019-01-01 RX ADMIN — ACETAMINOPHEN 650 MG: 325 TABLET ORAL at 20:41

## 2019-01-01 RX ADMIN — NIFEDIPINE 30 MG: 30 TABLET, EXTENDED RELEASE ORAL at 23:30

## 2019-01-01 RX ADMIN — SERTRALINE HYDROCHLORIDE 50 MG: 50 TABLET ORAL at 08:59

## 2019-01-01 RX ADMIN — LEVOTHYROXINE SODIUM 112 MCG: 112 TABLET ORAL at 06:09

## 2019-01-01 RX ADMIN — FUROSEMIDE 40 MG: 40 TABLET ORAL at 08:59

## 2019-01-01 RX ADMIN — IPRATROPIUM BROMIDE AND ALBUTEROL SULFATE 1 AMPULE: .5; 3 SOLUTION RESPIRATORY (INHALATION) at 20:23

## 2019-01-01 RX ADMIN — Medication 10 ML: at 08:19

## 2019-01-01 RX ADMIN — FORMOTEROL FUMARATE DIHYDRATE 20 MCG: 20 SOLUTION RESPIRATORY (INHALATION) at 08:34

## 2019-01-01 RX ADMIN — FORMOTEROL FUMARATE DIHYDRATE 20 MCG: 20 SOLUTION RESPIRATORY (INHALATION) at 20:23

## 2019-01-01 RX ADMIN — LEVOTHYROXINE SODIUM 112 MCG: 112 TABLET ORAL at 06:35

## 2019-01-01 RX ADMIN — IPRATROPIUM BROMIDE AND ALBUTEROL SULFATE 1 AMPULE: .5; 3 SOLUTION RESPIRATORY (INHALATION) at 14:25

## 2019-01-01 RX ADMIN — BUDESONIDE 500 MCG: 0.5 SUSPENSION RESPIRATORY (INHALATION) at 20:22

## 2019-01-01 RX ADMIN — FORMOTEROL FUMARATE DIHYDRATE 20 MCG: 20 SOLUTION RESPIRATORY (INHALATION) at 20:21

## 2019-01-01 RX ADMIN — ROSUVASTATIN CALCIUM 10 MG: 10 TABLET, COATED ORAL at 21:43

## 2019-01-01 RX ADMIN — FUROSEMIDE 20 MG: 10 INJECTION, SOLUTION INTRAMUSCULAR; INTRAVENOUS at 18:04

## 2019-01-01 RX ADMIN — ONDANSETRON 4 MG: 2 INJECTION INTRAMUSCULAR; INTRAVENOUS at 13:37

## 2019-01-01 RX ADMIN — BUDESONIDE 500 MCG: 0.5 SUSPENSION RESPIRATORY (INHALATION) at 08:35

## 2019-01-01 RX ADMIN — BUDESONIDE 500 MCG: 0.5 SUSPENSION RESPIRATORY (INHALATION) at 08:27

## 2019-01-01 RX ADMIN — LOSARTAN POTASSIUM 100 MG: 100 TABLET ORAL at 08:18

## 2019-01-01 RX ADMIN — ACETAMINOPHEN 650 MG: 325 TABLET ORAL at 01:05

## 2019-01-01 RX ADMIN — Medication 10 ML: at 09:00

## 2019-01-01 RX ADMIN — Medication 10 ML: at 20:44

## 2019-01-01 RX ADMIN — Medication 10 ML: at 23:18

## 2019-01-01 RX ADMIN — NIFEDIPINE 60 MG: 60 TABLET, FILM COATED, EXTENDED RELEASE ORAL at 21:32

## 2019-01-01 RX ADMIN — ACETAMINOPHEN 650 MG: 325 TABLET ORAL at 08:54

## 2019-01-01 RX ADMIN — NIFEDIPINE 30 MG: 30 TABLET, EXTENDED RELEASE ORAL at 21:11

## 2019-01-01 RX ADMIN — BUDESONIDE 500 MCG: 0.5 SUSPENSION RESPIRATORY (INHALATION) at 20:21

## 2019-01-01 RX ADMIN — ACETAMINOPHEN 650 MG: 325 TABLET ORAL at 21:11

## 2019-01-01 RX ADMIN — ACETAMINOPHEN 650 MG: 325 TABLET ORAL at 16:42

## 2019-01-01 RX ADMIN — Medication 10 ML: at 09:05

## 2019-01-01 RX ADMIN — NIFEDIPINE 30 MG: 30 TABLET, EXTENDED RELEASE ORAL at 08:53

## 2019-01-01 RX ADMIN — SERTRALINE HYDROCHLORIDE 50 MG: 50 TABLET ORAL at 08:18

## 2019-01-01 RX ADMIN — ROSUVASTATIN CALCIUM 10 MG: 10 TABLET, COATED ORAL at 20:47

## 2019-01-01 RX ADMIN — FUROSEMIDE 20 MG: 10 INJECTION, SOLUTION INTRAMUSCULAR; INTRAVENOUS at 17:39

## 2019-01-01 RX ADMIN — ROSUVASTATIN CALCIUM 10 MG: 10 TABLET, COATED ORAL at 20:41

## 2019-01-01 RX ADMIN — FORMOTEROL FUMARATE DIHYDRATE 20 MCG: 20 SOLUTION RESPIRATORY (INHALATION) at 08:27

## 2019-01-01 RX ADMIN — ACETAMINOPHEN 650 MG: 325 TABLET ORAL at 08:16

## 2019-01-01 RX ADMIN — SERTRALINE HYDROCHLORIDE 50 MG: 50 TABLET ORAL at 08:03

## 2019-01-01 RX ADMIN — BUDESONIDE 500 MCG: 0.5 SUSPENSION RESPIRATORY (INHALATION) at 20:41

## 2019-01-01 RX ADMIN — ACETAMINOPHEN 650 MG: 325 TABLET ORAL at 04:05

## 2019-01-01 RX ADMIN — NIFEDIPINE 30 MG: 30 TABLET, EXTENDED RELEASE ORAL at 08:03

## 2019-01-01 RX ADMIN — IPRATROPIUM BROMIDE AND ALBUTEROL SULFATE 1 AMPULE: .5; 3 SOLUTION RESPIRATORY (INHALATION) at 08:51

## 2019-01-01 RX ADMIN — ONDANSETRON 4 MG: 2 INJECTION INTRAMUSCULAR; INTRAVENOUS at 10:51

## 2019-01-01 ASSESSMENT — PAIN SCALES - GENERAL
PAINLEVEL_OUTOF10: 0
PAINLEVEL_OUTOF10: 3
PAINLEVEL_OUTOF10: 5
PAINLEVEL_OUTOF10: 3
PAINLEVEL_OUTOF10: 3
PAINLEVEL_OUTOF10: 5
PAINLEVEL_OUTOF10: 3
PAINLEVEL_OUTOF10: 4
PAINLEVEL_OUTOF10: 5
PAINLEVEL_OUTOF10: 0
PAINLEVEL_OUTOF10: 7
PAINLEVEL_OUTOF10: 7
PAINLEVEL_OUTOF10: 6
PAINLEVEL_OUTOF10: 2
PAINLEVEL_OUTOF10: 0
PAINLEVEL_OUTOF10: 3
PAINLEVEL_OUTOF10: 0
PAINLEVEL_OUTOF10: 3
PAINLEVEL_OUTOF10: 3
PAINLEVEL_OUTOF10: 7
PAINLEVEL_OUTOF10: 4
PAINLEVEL_OUTOF10: 2
PAINLEVEL_OUTOF10: 0
PAINLEVEL_OUTOF10: 3
PAINLEVEL_OUTOF10: 0
PAINLEVEL_OUTOF10: 5
PAINLEVEL_OUTOF10: 0
PAINLEVEL_OUTOF10: 5
PAINLEVEL_OUTOF10: 0
PAINLEVEL_OUTOF10: 3
PAINLEVEL_OUTOF10: 0
PAINLEVEL_OUTOF10: 3
PAINLEVEL_OUTOF10: 0
PAINLEVEL_OUTOF10: 4

## 2019-01-01 ASSESSMENT — PAIN - FUNCTIONAL ASSESSMENT

## 2019-01-01 ASSESSMENT — ENCOUNTER SYMPTOMS
SHORTNESS OF BREATH: 1
ABDOMINAL PAIN: 0
NAUSEA: 1
COUGH: 0
ABDOMINAL DISTENTION: 0
VOMITING: 1
DIARRHEA: 1
EYE DISCHARGE: 0
COLOR CHANGE: 0
CHEST TIGHTNESS: 0
PHOTOPHOBIA: 0

## 2019-01-01 ASSESSMENT — PAIN DESCRIPTION - ONSET
ONSET: ON-GOING
ONSET: ON-GOING
ONSET: SUDDEN
ONSET: ON-GOING
ONSET: GRADUAL
ONSET: GRADUAL
ONSET: ON-GOING
ONSET: AWAKENED FROM SLEEP

## 2019-01-01 ASSESSMENT — PAIN DESCRIPTION - ORIENTATION
ORIENTATION: MID
ORIENTATION: RIGHT
ORIENTATION: MID
ORIENTATION: MID;UPPER
ORIENTATION: RIGHT
ORIENTATION: ANTERIOR;RIGHT;POSTERIOR
ORIENTATION: RIGHT
ORIENTATION: RIGHT;LEFT
ORIENTATION: RIGHT
ORIENTATION: RIGHT
ORIENTATION: MID

## 2019-01-01 ASSESSMENT — PAIN DESCRIPTION - FREQUENCY
FREQUENCY: INTERMITTENT
FREQUENCY: CONTINUOUS
FREQUENCY: CONTINUOUS

## 2019-01-01 ASSESSMENT — PAIN DESCRIPTION - PROGRESSION
CLINICAL_PROGRESSION: GRADUALLY WORSENING
CLINICAL_PROGRESSION: NOT CHANGED

## 2019-01-01 ASSESSMENT — PAIN DESCRIPTION - DESCRIPTORS
DESCRIPTORS: ACHING
DESCRIPTORS: ACHING;CRAMPING
DESCRIPTORS: ACHING
DESCRIPTORS: DISCOMFORT
DESCRIPTORS: ACHING
DESCRIPTORS: SHARP
DESCRIPTORS: ACHING
DESCRIPTORS: ACHING
DESCRIPTORS: DISCOMFORT
DESCRIPTORS: DISCOMFORT
DESCRIPTORS: ACHING

## 2019-01-01 ASSESSMENT — PAIN DESCRIPTION - PAIN TYPE
TYPE: ACUTE PAIN
TYPE: CHRONIC PAIN
TYPE: ACUTE PAIN
TYPE: CHRONIC PAIN
TYPE: ACUTE PAIN
TYPE: ACUTE PAIN;CHRONIC PAIN
TYPE: CHRONIC PAIN;ACUTE PAIN
TYPE: CHRONIC PAIN
TYPE: CHRONIC PAIN
TYPE: ACUTE PAIN
TYPE: ACUTE PAIN
TYPE: CHRONIC PAIN;ACUTE PAIN
TYPE: ACUTE PAIN
TYPE: CHRONIC PAIN

## 2019-01-01 ASSESSMENT — PAIN DESCRIPTION - LOCATION
LOCATION: NECK;HEAD
LOCATION: GENERALIZED
LOCATION: HEAD
LOCATION: GENERALIZED
LOCATION: ABDOMEN;FLANK
LOCATION: HEAD
LOCATION: HEAD
LOCATION: GENERALIZED
LOCATION: HEAD;NECK
LOCATION: GENERALIZED
LOCATION: HEAD;NECK
LOCATION: HEAD;NECK
LOCATION: HEAD
LOCATION: GENERALIZED
LOCATION: NECK;BACK

## 2019-05-04 PROBLEM — R06.02 SHORTNESS OF BREATH: Status: ACTIVE | Noted: 2019-01-01

## 2019-05-04 NOTE — ED NOTES
ED MD and PA notified regarding Suicidal precaution screening and how patient scored. They felt that they are not necessary. Patient denied having a plan or actively wanting to end her life. She states that she had not had any actions or plans previously in her life and that her current feelings are regarding her current situation with kidneys. Family at bedside.       Trae Martell RN  05/04/19 9084

## 2019-05-04 NOTE — ED NOTES
Inpatient MD notified regarding Suicidal precaution screening and how patient scored. He felt that they are not necessary. Patient denied having a plan or actively wanting to end her life. She states that she had not had any actions or plans previously in her life and that her current feelings are regarding her current situation with kidneys. Family at bedside.       Debby Perez RN  05/04/19 8305

## 2019-05-04 NOTE — H&P
Resident History and Physical      Hospital Day: 1   Code:No Order  Admit Date: 5/4/2019  3:36 PM            PCP: Gissell Ramos MD                                  Chief Complaint: shortness of breath    History of present illness:   Janeece Nyhan is a 75 y.o. female with a h/o CKD 4, HLD, HTN, COPD, Acquired hypothyroidism p/w progressive shortness of breath for several weeks. Also having poor energy and fatigue. Initermittent swelling of legs, ankles, left breast. No fevers, no chills. Poor appetite. Not voiding well. Started having nausea and diarrhea this morning. Also having headaches which are similar to migraines she has had in the past. No recent travel, recent sick contacts, recent changes to her diet. In the past few months, although not in the last 4-6 weeks, she had thoughts of death and not wanting to be alive but denies active suicidal ideation or formation of a suicide plan. She reports she has never had a colonoscopy    Saw her PCP yesterday who checked labs and advised she go to ED if she felt worse. Review of systems:   History obtained from the patient    Review of Systems   Constitutional: Positive for activity change, appetite change and fatigue. Negative for chills, diaphoresis and fever. Eyes: Negative for photophobia and visual disturbance. Respiratory: Positive for shortness of breath. Negative for cough and chest tightness. Cardiovascular: Positive for leg swelling. Negative for chest pain and palpitations. Gastrointestinal: Positive for diarrhea, nausea and vomiting. Negative for abdominal distention and abdominal pain. Endocrine: Negative for polydipsia and polyuria. Genitourinary: Negative for dysuria, frequency and urgency. Musculoskeletal:        Chronic back pain   Skin: Negative for color change and rash. Neurological: Positive for weakness and headaches. Negative for dizziness, facial asymmetry and speech difficulty. Hematological: Negative for adenopathy. Abdominal: Soft. Bowel sounds are normal. She exhibits no distension. There is no tenderness. Genitourinary:   Genitourinary Comments: No CVA tenderness to percussion   Musculoskeletal: Normal range of motion. She exhibits edema. No tenderness to palpation of spine   Lymphadenopathy:     She has no cervical adenopathy. Neurological: She is alert and oriented to person, place, and time. Strength 5/5 BUE on gross exam, 4/5 BLE on gross exam   Skin: Skin is warm and dry. Diffuse pallor   Psychiatric:   Depressed mood; blunted affect   Vitals reviewed.                                               ===================================================================  Consults:   IP CONSULT TO HOSPITALIST  ===================================================================  Laboratory Data:    CBC:   Lab Results   Component Value Date    WBC 11.0 05/04/2019    HGB 8.7 (L) 05/04/2019    HCT 26.7 (L) 05/04/2019    MCV 99.5 05/04/2019     05/04/2019       BMP:   Lab Results   Component Value Date    CREATININE 2.0 (H) 05/04/2019    BUN 28 (H) 05/04/2019     05/04/2019    K 3.2 (L) 05/04/2019     05/04/2019    CO2 18 (L) 05/04/2019       LFT's:   Recent Labs     05/04/19  1612   AST 22   ALT 14   BILITOT <0.2   ALKPHOS 72       INR: No results for input(s): INR in the last 72 hours. Troponin:   Recent Labs     05/04/19  1612   TROPONINI 0.01       BNP: No results for input(s): BNP in the last 72 hours. ABGs: No results for input(s): PHART, WAL5OTH, PO2ART in the last 72 hours. [email or web address]    ===================================================================    Imaging, EKG and other studies:   XR CHEST PORTABLE   Final Result      No focal consolidation. Mild cardiomegaly without pulmonary vascular congestion. EKG: NSR; no ST segment or T wave changes suggestive of ischemia    ===================================================================    Assessment and plan:    Shortness of breath  -nonischemic EKG  -check echocardiogram  -furosemide 20mg IV x1  -check lipid panel  -Nephrology consult  -check I&Os    Fatigue  Malaise  Anemia  -TSH checked 5/3, within normal limits  -check iron studies  -check cortisol  -check B12  -check folate    Diarrhea  BLood per rectum  -positive hemoccult studies in ER  -check c diff toxin  -check CBC in AM    CKD 4  HTN  -nephrology consult  -check renal panel in morning  -home losartan  -nifedipine 30mg daily    Headache - patient with history of migraines  -fioricet    Depression/anxiety  -no active suicidal ideation at this time  -continue home sertraline    Hypothyroidism  -home levothyroxine    HLD  -home rosuvastatin          CODE: DNR-CCA  FEN: renal diet  PPX: SCDs; no DVT chemoprophylaxis given positive hemoccult testing  DISPO: wards     This patient has been staffed and discussed with Eber Vila MD.   -----------------------------  Jose D Mcclure MD PGY-3  05/04/19

## 2019-05-04 NOTE — FLOWSHEET NOTE
Received patient from ED. Repostioes patient . Placed patient on heart monitor , NSR. Patient repeatedly telling me se has a repositioned headache and the Tylenol they gave her in the ED didn't work.   Waiting for admission orders

## 2019-05-04 NOTE — ED NOTES
Report given to Tova Simental on [facility].  Patient will be transported to room 6314 shortly      Radha Castellanos RN  05/04/19 6013

## 2019-05-04 NOTE — ED TRIAGE NOTES
Patient presents to ED from home with complaints of shortness of breath ongoing for a few weeks. Was seen by PCP yesterday with labs drawn.  vss

## 2019-05-04 NOTE — ED PROVIDER NOTES
[address] ENCOUNTER          PHYSICIAN ASSISTANT NOTE     Date of evaluation: 5/4/2019    Chief Complaint     Shortness of Breath    History of Present Illness     Yanira Méndez is a 75 y.o. female with PMHx CKD, COPD, HLD, HTN who presents to the ED for worsening SOB. History limited due to dyspnea. She endorses BLE edema, as well as N/V/D. She denies chest pain cough, fever. Review of Systems     Review of Systems   Constitutional: Negative for fever. HENT: Negative for ear discharge. Eyes: Negative for discharge. Respiratory: Positive for shortness of breath. Negative for cough. Cardiovascular: Positive for leg swelling. Negative for chest pain. Gastrointestinal: Positive for diarrhea, nausea and vomiting. Negative for abdominal pain. Skin: Negative for pallor. Psychiatric/Behavioral: Negative for confusion. Past Medical, Surgical, Family, and Social History     She has a past medical history of Arthritis, Chronic kidney disease, COPD (chronic obstructive pulmonary disease) ([address] Hyperlipidemia, and Hypertension. She has no past surgical history on file. Her family history is not on file. She reports that she has been smoking cigarettes. She does not have any smokeless tobacco history on file. She reports that she does not drink alcohol or use drugs. Medications     Previous Medications    ALBUTEROL SULFATE HFA (PROAIR HFA) 108 (90 BASE) MCG/ACT INHALER    Inhale 2 puffs into the lungs    ASPIRIN 81 MG TABLET    Take 81 mg by mouth    NIFEDIPINE (ADALAT CC) 30 MG EXTENDED RELEASE TABLET    Take 30 mg by mouth       Allergies     She has no allergies on file. Physical Exam     INITIAL VITALS: BP: (!) 175/75, Temp: 96.5 °F (35.8 °C), Pulse: 88, Resp: 22, SpO2: 97 %  Physical Exam   Constitutional: She is oriented to person, place, and time. She appears well-developed and well-nourished. HENT:   Head: Normocephalic and atraumatic.    Eyes: Conjunctivae are normal.   Neck: Neck supple. Cardiovascular: Normal rate, regular rhythm and normal heart sounds. Pulmonary/Chest:   Increased WOB, shallow respirations   Abdominal: Soft. Musculoskeletal:   2+ BLE edema up to knees   Neurological: She is alert and oriented to person, place, and time. Skin: Skin is warm and dry. Psychiatric: She has a normal mood and affect. Her behavior is normal.   Nursing note and vitals reviewed. Diagnostic Results     EKG   Interpreted in conjunction with emergency department physician No att. providers found  Rhythm: normal sinus   Rate: 85bpm  ST Segments: Nonspecific  Clinical Impression: NSR    RADIOLOGY:  XR CHEST PORTABLE   Final Result      No focal consolidation. Mild cardiomegaly without pulmonary vascular congestion.                     LABS:   Results for orders placed or performed during the hospital encounter of 05/04/19   CBC auto differential   Result Value Ref Range    WBC 11.0 4.0 - 11.0 K/uL    RBC 2.68 (L) 4.00 - 5.20 M/uL    Hemoglobin 8.7 (L) 12.0 - 16.0 g/dL    Hematocrit 26.7 (L) 36.0 - 48.0 %    MCV 99.5 80.0 - 100.0 fL    MCH 32.4 26.0 - 34.0 pg    MCHC 32.5 31.0 - 36.0 g/dL    RDW 14.1 12.4 - 15.4 %    Platelets 493 540 - 896 K/uL    MPV 8.3 5.0 - 10.5 fL    Neutrophils % 83.0 %    Lymphocytes % 11.8 %    Monocytes % 4.0 %    Eosinophils % 0.8 %    Basophils % 0.4 %    Neutrophils # 9.1 (H) 1.7 - 7.7 K/uL    Lymphocytes # 1.3 1.0 - 5.1 K/uL    Monocytes # 0.4 0.0 - 1.3 K/uL    Eosinophils # 0.1 0.0 - 0.6 K/uL    Basophils # 0.0 0.0 - 0.2 K/uL   Basic Metabolic Panel   Result Value Ref Range    Sodium 141 136 - 145 mmol/L    Potassium 3.2 (L) 3.5 - 5.1 mmol/L    Chloride 108 99 - 110 mmol/L    CO2 18 (L) 21 - 32 mmol/L    Anion Gap 15 3 - 16    Glucose 137 (H) 70 - 99 mg/dL    BUN 28 (H) 7 - 20 mg/dL    CREATININE 2.0 (H) 0.6 - 1.2 mg/dL    GFR Non-African American 24 (A) >60    GFR  29 (A) >60    Calcium 9.2 8.3 - 10.6 mg/dL   Brain Natriuretic Peptide   Result Value Ref Range    Pro- (H) 0 - 449 pg/mL   Troponin   Result Value Ref Range    Troponin 0.01 <0.01 ng/mL   Hepatic function panel (LFTs)   Result Value Ref Range    Total Protein 5.9 (L) 6.4 - 8.2 g/dL    Alb 3.5 3.4 - 5.0 g/dL    Alkaline Phosphatase 72 40 - 129 U/L    ALT 14 10 - 40 U/L    AST 22 15 - 37 U/L    Total Bilirubin <0.2 0.0 - 1.0 mg/dL    Bilirubin, Direct <0.2 0.0 - 0.3 mg/dL    Bilirubin, Indirect see below 0.0 - 1.0 mg/dL   Lipase   Result Value Ref Range    Lipase 34.0 13.0 - 60.0 U/L   POCT Venous   Result Value Ref Range    pH, [place] 7.378 7.350 - 7.450    pCO2, [place] 30.8 (L) 40.0 - 50.0 mm Hg    pO2, [place] 59 Not Established mm Hg    HCO3, Venous 18.2 (L) 23.0 - 29.0 mmol/L    Base Excess, [place] -7 (L) -3 - 3    O2 Sat, [place] 90 Not Established %    TC02 (Calc), [place] 19 Not Established mmol/L    Lactate 0.78 0.40 - 2.00 mmol/L    Sample Type ALVERTO     Performed on SEE BELOW    POCT Blood Occult   Result Value Ref Range    POC Occult Blood Stool Positive Negative     RECENT VITALS:  BP: (!) 157/83, Temp: 96.5 °F (35.8 °C), Pulse: 85, Resp: 21, SpO2: 96 %     Procedures       ED Course     Nursing Notes, Past Medical Hx,Past Surgical Hx, Social Hx, Allergies, and Family Hx were reviewed. The patient was given the following medications:  Orders Placed This Encounter   Medications    acetaminophen (TYLENOL) tablet 1,000 mg     CONSULTS:  Anu 26 / ASSESSMENT / Ashlie Morejonar is a 75 y.o. female here for SOB. Pt is afebrile and hemodynamically stable. ECG nonischemic. CXR notable for cardiomegaly. Labs concerning for worsening anemia as of yesterday. Hypokalemia also noted along with worsening GFR. BNP elevated. Normal troponin. At this time, the most likely diagnosis is SOB likely d/t volume overload, as well as heme positive stool. The pt was admitted to Dr. Rica Esposito [facility]  AOD was contacted. This patient was also evaluated by the attending physician. All care plans were discussed and agreed upon. Clinical Impression     1. SOB (shortness of breath)    2. Heme positive stool    3.  Hypervolemia, unspecified hypervolemia type        Disposition     PATIENT REFERRED TO:  Stacie Grey MD            DISCHARGE MEDICATIONS:  New Prescriptions    No medications on file       [address] [facility]  05/04/19 1614

## 2019-05-04 NOTE — PROGRESS NOTES
4 Eyes Admission Assessment     I agree as the admission nurse that 2 RN's have performed a thorough Head to Toe Skin Assessment on the patient. ALL assessment sites listed below have been assessed on admission. Areas assessed by both nurses: ***  [x]   Head, Face, and Ears   [x]   Shoulders, Back, and Chest  [x]   Arms, Elbows, and Hands   [x]   Coccyx, Sacrum, and Ischum  [x]   Legs, Feet, and Heels        Does the Patient have Skin Breakdown?   Redness to left elbow        Teja Prevention initiated:  Yes   Wound Care Orders initiated:  No      [facility] nurse consulted for Pressure Injury (Stage 3,4, Unstageable, DTI, NWPT, and Complex wounds):  No      Nurse 1 eSignature: Electronically signed by Eleazar Wagner RN on 5/4/19 at 6:59 PM    **SHARE this note so that the co-signing nurse is able to place an eSignature**    Nurse 2 eSignature: {Esignature:[id number]}

## 2019-05-05 NOTE — CONSULTS
[facility]    [facility]. [employer]              [phone]                       Plan : Will check renal ultrasound  Check for protein/cr ratio  Echo  And diuretics depending on echo  Got lasix yesterday    Assessment :     NEGRITA on CKD III  Cr peaked to 2  But her baseline appears to be between 1.7- 2 for some time now  She is known to have CKD IV- seen by , Dr Smita Barrios to have membranous nephropathy, hypertension, and took NSAID's in    the past  Cr 1.7 form 1/2017  She used to see Dr Easton Kimbrough, and last saw on 3/5/13, and then started seeing  Me on 3/6/18- and last visit was on 4/18, Then saw  Dr Dyan Singh at [facility] on    8/18    Renal imaging:   Renal biopsy: 2009- [place]  No urology  Echo: 60-65%- Grade I DD, mild LVH, Normal EF from 2017        Hypertension   BP: (170-184)/(73-76)  Pulse:  [91-93]    BP very high  Back on losartan today  On nifedipine      Anemia of CKD  Checking iron studies,   B12/lolate    migraines  Has tremor  Former smoker    SOB  Not sure if she's better  Got a dose of lasix,making urine  Echo pending  Xray- possible vascular congestion          [facility] would like to thank Janna Denver, MD   for opportunity to serve this patient      Please call with questions at-   24 Hrs Answering service [phone] or  7 am- 5 pm via Perfect serve or cell phone  Dr.Sudhir Bhat Persons      CC/reason for consult :     NEGRITA     HPI :     Dior Vizcaino is a 75 y.o. female presented to   the hospital on 5/4/2019 with shortness of   Breath. Complains of headache, depression,  Lethargy. She was seen by PCP yesterday and was told   To go to the hospital.    She is known to have CKD, seen by Dr Roselyn Mitchell   In the hospital multiple times. She didn't follow  Up as advised. We are consulted for CKD and related issues.      Interval History:     Sob getting better  Feeling better    ROS:     Seen with- no family    positives in bold   Constitutional:  fever, chills, weakness, weight change, fatigue  Skin:  rash, pruritus, hair loss, bruising, dry skin, petechiae  Head, Face, Neck   headaches, swelling,  cervical adenopathy  Respiratory: shortness of breath, cough, or wheezing  Cardiovascular: chest pain, palpitations, dizzy, edema  Gastrointestinal: nausea, vomiting, diarrhea, constipation,belly pain    Yellow skin, blood in stool  Musculoskeletal:  back pain, muscle weakness, gait problems,       joint pain or swelling. Genitourinary:  dysuria, poor urine flow, flank pain, blood in urine  Neurologic:  vertigo, TIA'S, syncope, seizures, focal weakness  Psychosocial:  insomnia, anxiety, or depression. Additional positive findings:                          All other remaining systems are negative. PMH/PSH/SH/Family History:     Past Medical History:   Diagnosis Date    Arthritis     Chronic kidney disease     stage 4    [facility]chronic obstructive [facility] disease) ([facility])     Hyperlipidemia     Hypertension        History reviewed. No pertinent surgical history. Social History     Socioeconomic History    Marital status:       Spouse name: Not on file    Number of children: Not on file    Years of education: Not on file    Highest education level: Not on file   Occupational History    Not on file   Social Needs    Financial resource strain: Not on file    Food insecurity:     Worry: Not on file     Inability: Not on file    Transportation needs:     Medical: Not on file     Non-medical: Not on file   Tobacco Use    Smoking status: Former Smoker     Types: Cigarettes    Smokeless tobacco: Never Used   Substance and Sexual Activity    Alcohol use: No    Drug use: No    Sexual activity: Not on file   Lifestyle    Physical activity:     Days per week: Not on file     Minutes per session: Not on file    Stress: Not on file   Relationships    Social connections:     Talks on phone: Not on file     Gets together: Not on file     Attends Mandaen service: Not on file Active member of club or organization: Not on file     Attends meetings of clubs or organizations: Not on file     Relationship status: Not on file    Intimate partner violence:     Fear of current or ex partner: Not on file     Emotionally abused: Not on file     Physically abused: Not on file     Forced sexual activity: Not on file   Other Topics Concern    Not on file   Social History Narrative    Not on file       History reviewed. No pertinent family history. Medication:     Scheduled Meds:   levothyroxine  112 mcg Oral Daily    losartan  100 mg Oral Daily    rosuvastatin  10 mg Oral Nightly    sertraline  50 mg Oral Daily    sodium chloride flush  10 mL Intravenous 2 times per day    NIFEdipine  30 mg Oral BID     Continuous Infusions:  PRN Meds:.sodium chloride flush, ondansetron, senna, acetaminophen       Vitals :     Vitals:    05/05/19 0802   BP: (!) 184/76   Pulse: 91   Resp: 18   Temp: 98 °F (36.7 °C)   SpO2: 94%       I & O :       Intake/Output Summary (Last 24 hours) at 5/5/2019 0043  Last data filed at 5/5/2019 0732  Gross per 24 hour   Intake 240 ml   Output 500 ml   Net -260 ml        Physical Examination :     General appearance: Anxious- no, distressed- no, in good spirits- no    lethargic  HEENT: Lips- normal, teeth- ok , oral mucosa- moist  Neck : Mass- no, appears symmetrical, JVD- not visible  Respiratory: Respiratory effort-  Normal at rest, no oxygen, wheeze- no, crackles - no  Cardiovascular:  Ausculation- No M/R/G, Edema 1 +  Abdomen: visible mass- no, distention- no, scar- no, tenderness- no                            hepatosplenomegaly-  no  Musculoskeletal:  clubbing no,cyanosis- no , digital ischemia- no                           muscle strength- grossly normal , tone - grossly normal  Skin: rashes- no , ulcers- no, induration- no, tightening - no  Psychiatric:  Judgement and insight- normal           AAO X 3     LABS:     Recent Labs     05/04/19  1612 05/05/19  0831   WBC 11.0 13.1*   HGB 8.7* 9.5*   HCT 26.7* 29.4*    279     Recent Labs     05/04/19  1612 05/05/19  0831    143   K 3.2* 3.3*    107   CO2 18* 22   BUN 28* 25*   CREATININE 2.0* 2.0*   GLUCOSE 137* 108*   MG  --  2.00

## 2019-05-05 NOTE — PLAN OF CARE
Problem: Risk for Impaired Skin Integrity  Goal: Tissue integrity - skin and mucous membranes  Description  Structural intactness and normal physiological function of skin and  mucous membranes. Outcome: Ongoing   Pt. Turns and repositions q 2 hrs and prn to maintain skin integrity. Heels are elevated off the bed for protection    Problem: Pain:  Goal: Control of acute pain  Description  Control of acute pain  Outcome: Ongoing   Pt is using prn medications and repositioning for pain control.

## 2019-05-05 NOTE — PROGRESS NOTES
Progress Note    Hospital Day: 2                                                         Code:DNR-CCA  Admit Date: 5/4/2019                                 PCP: Purvi Jones MD                     Daily Plan:  5/5/2019    Subjective:   Patient feeling mildly improved this AM. Headache still present. She denies N/V, abdominal pain, chest pain, SOB, fevers, chills. MEDICATIONS:   Scheduled Meds:   levothyroxine  112 mcg Oral Daily    losartan  100 mg Oral Daily    rosuvastatin  10 mg Oral Nightly    sertraline  50 mg Oral Daily    sodium chloride flush  10 mL Intravenous 2 times per day    NIFEdipine  30 mg Oral BID      Continuous Infusions:  PRN Meds:sodium chloride flush, ondansetron, senna, acetaminophen    Allergies: Allergies   Allergen Reactions    Penicillins Hives    Sulfa Antibiotics        PHYSICAL EXAM:       Vitals: BP (!) 184/76   Pulse 91   Temp 98 °F (36.7 °C) (Oral)   Resp 18   Ht 5' 4\" (1.626 m)   Wt 149 lb 4 oz (67.7 kg)   SpO2 94%   BMI 25.62 kg/m²     I/O:      Intake/Output Summary (Last 24 hours) at 5/5/2019 1408  Last data filed at 5/5/2019 0732  Gross per 24 hour   Intake 240 ml   Output 500 ml   Net -260 ml     I/O this shift:  In: -   Out: 100 [Urine:100]  I/O last 3 completed shifts: In: 240 [P.O.:240]  Out: 400 [Urine:400]    Physical Examination:   · General appearance: Appears comfortable at rest, fully alert and orientated    · HEENT: Atraumatic, normocephalic, moist mucus membranes  · Respiratory: Normal respiratory effort. No wheezes, rubs, or crackles  · Cardiovascular: regular S1/S2, with no Murmur, rub or gallop. · Abdomen: Soft, non-tender, non-distended  · Musculoskeletal: No clubbing, cyanosis, 2+ lower extremity edema, peripheral pulses present, cap refill < 2sec  · Neurologic: Neurovascularly grossly intact without any focal motor deficits.  Cranial nerves:  grossly non-focal.      No results for input(s): PHART, TLP5LZF, PO2ART in the last 72 hours.    DATA:       Labs  CBC:   Recent Labs     05/04/19  1612 05/05/19  0831   WBC 11.0 13.1*   HGB 8.7* 9.5*   HCT 26.7* 29.4*    279       BMP:   Recent Labs     05/04/19  1612 05/05/19  0831    143   K 3.2* 3.3*    107   CO2 18* 22   BUN 28* 25*   CREATININE 2.0* 2.0*   GLUCOSE 137* 108*     LFT's:   Recent Labs     05/04/19  1612   AST 22   ALT 14   BILITOT <0.2   ALKPHOS 72     Troponin:   Recent Labs     05/04/19  1612   TROPONINI 0.01     BNP: No results for input(s): BNP in the last 72 hours. ABGs: No results for input(s): PHART, IXF0BGT, PO2ART in the last 72 hours. INR: No results for input(s): INR in the last 72 hours. Lipids:   Recent Labs     05/04/19  1612   CHOL 153   HDL 57       U/A:No results for input(s): NITRITE, COLORU, PHUR, LABCAST, WBCUA, RBCUA, MUCUS, TRICHOMONAS, YEAST, BACTERIA, CLARITYU, SPECGRAV, LEUKOCYTESUR, UROBILINOGEN, BILIRUBINUR, BLOODU, GLUCOSEU, AMORPHOUS in the last 72 hours. Invalid input(s): Alfreda Wu     ASSESSMENT AND PLAN:   Lauren Diamond is a 75 y.o. female     Shortness of breath - likely fluid overload.  Minimal diuresis with lasix  -check echocardiogram  -will try lasix again pending echocardiogram  -check lipid panel  -Nephrology consult  -check I&Os     Anemia -TSH checked 5/3, within normal limits  -check iron studies  -check cortisol  -check B12  -check folate     Diarrhea  BLood per rectum  -positive hemoccult studies in ER  -check c diff toxin     HTN  -nephrology consult  -home losartan, nifedipine 30mg daily     Headache - patient with history of migraines  -fioricet  - CT head     Depression/anxiety  -no active suicidal ideation at this time  -continue home sertraline     Hypothyroidism  -home levothyroxine     HLD  -home rosuvastatin     CODE: DNR-CCA  FEN: renal diet  PPX: SCDs; no DVT chemoprophylaxis given positive hemoccult testing  DISPO: wards     W/D/W/A  -----------------------------  Alex Marsh, PGY3  5/5/2019

## 2019-05-06 NOTE — PROGRESS NOTES
Pt had one episode of 50ml loose stool. Outstanding order for c-diff r/o sample- sample collected and taken to lab.

## 2019-05-06 NOTE — CONSULTS
Internal Medicine PGY- 1 Resident Pulmonary consutl      PCP: Mani Jeff MD    Date of Admission: 5/4/2019    Chief Complaint:  Fatigue, malaise     HPI: Francia Ely is a 75 y.o. female with a h/o CKD 4 (history of membranous nephropathy), HLD, HTN, COPD, Acquired hypothyroidism p/w progressive shortness of breath for several weeks. She says that this has been accompanied by worsening lower extremity edema. Shortness of breath is worse with activity and at night. She is requiring pillows ar night to decrease shortness of breath. No chest pain. Shortness of breath is relieved with rest. She does have a history of COPD with albuterol use infrequently, doesn't use any maintenance inhaher. Has a chronic cough that is slightly worse over the same time. She doesn't endorse sputum production. No fever, Chills, Headaches, weight loss. No history of recent viral illness of, recent ill contacts. Past Medical History:          Diagnosis Date    Arthritis     Chronic kidney disease     stage 4    COPD (chronic obstructive pulmonary disease) (HCC)     Hyperlipidemia     Hypertension        PastSurgical History:      History reviewed. No pertinent surgical history. Medications Prior to Admission:      Prior to Admission medications    Medication Sig Start Date End Date Taking? Authorizing Provider   albuterol sulfate HFA (PROAIR HFA) 108 (90 Base) MCG/ACT inhaler Inhale 2 puffs into the lungs 4/18/18  Yes Historical Provider, MD   NIFEdipine (ADALAT CC) 30 MG extended release tablet Take 30 mg by mouth 3/6/18  Yes Historical Provider, MD   aspirin 81 MG tablet Take 81 mg by mouth    Historical Provider, MD       Allergies: Penicillins and Sulfa antibiotics    Social History:      TOBACCO:   reports that she has quit smoking. Her smoking use included cigarettes. She has never used smokeless tobacco.  ETOH:   reports that she does not drink alcohol. Family History:     History reviewed.  No pertinent family recent intracranial hemorrhage. 2. Left frontal meningioma slightly increased in size from 12/15/2009. 3. Parenchymal volume loss and white matter signal changes nonspecific but most likely chronic small vessel ischemic changes. 4. 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 XR CHEST PORTABLE   Final Result      No focal consolidation. Mild cardiomegaly without pulmonary vascular congestion. US RENAL COMPLETE    (Results Pending)       ASSESSMENT & PLAN:    Shortness of breath   - Objectively not requiring any oxygen   - Clinically sounds like fluid overload with Orthopnea, Lower extremity edema, dyspnea on exertion  - chest xray shows Mild cardiomegaly without pulmonary vascular congestion.  - Lung exam was fairly normal   - 1.2 Liters urine output   - hold lasix until cleared by  Nephrology   - Echocardiogram today, last one in 2017 was normal    COPD   - Extensive history of smoking 45 pack years   - Slight worsening of cough, no change in sputum production   - not significant wheezing on physical exam   - Viral resp culture   - Breathing treatments   - No PFT on record     DVT Prophylaxis: SCD  Diet: DIET RENAL;     Brando Cam MD    I will discuss the patient with the Dr. Nichelle Marion MD  Internal Medicine Resident, PGY-1    Patient seen, examined and discussed with the resident and I agree with the assessment and plan as edited above. Likely has smoking related lung disease but no evidence of exacerbation at this time if she does. Will need outpatient PFTs as she's a poor historian and limited by spinal deformity and pain. At risk for atypical mycobacterial disease with her scoliosis. Qualifies for a low dose screening CT for pulmonary nodules as an outpatient. Current worsening of dyspnea is likely fluid volume related. Continue diuresis, no evidence of infection.     Trung Watkins MD

## 2019-05-06 NOTE — PLAN OF CARE
Problem: Falls - Risk of:  Goal: Will remain free from falls  Description  Will remain free from falls  Outcome: Ongoing  Note:   Patient at risk for falls. Patient resting quietly in bed. Side rails up x 2. Bed locked in lowest position. Bed alarm on. Bedside table and call light within reach. Patient instructed to call for assistance. Patient verbalized understanding. Calls appropriately. Will continue to monitor. Problem: Pain:  Goal: Pain level will decrease  Description  Pain level will decrease  Outcome: Ongoing  Note:   Pt c/o headache rated 3-4/10. Provided with ice pack per pt request for neck and tylenol PRN. See MAR. Pt asleep in response, expresses gratitude. VSS. Will continue to monitor.

## 2019-05-06 NOTE — PLAN OF CARE
Problem: Falls - Risk of:  Goal: Will remain free from falls  Description  Will remain free from falls. Bed alarm on, pt instructed to call for assist OOB/ to RR. Pt A&O, non-slip socks on, will continue to monitor.    5/6/2019 1652 by Patsy Spicer RN  Outcome: Ongoing

## 2019-05-06 NOTE — CONSULTS
bilaterally  XI: Shoulder shrug equal bilaterally  XII: Tongue midline    Musculoskeletal:   Gait: Not tested   Assist devices: None   Tone: normal   Motor strength:    Right  Left    Right  Left    Deltoid  5 5  Hip Flex  5 5   Biceps  5 5  Knee Extensors  5 5   Triceps  5 5  Knee Flexors  5 5   Wrist Ext  5 5  Ankle Dorsiflex. 5 5   Wrist Flex  5 5  Ankle Plantarflex. 5 5   Handgrip  5 5  Ext Hayden Longus  5 5   Thumb Ext  5 5         IMAGING     CT head without contrast.       Indication: Severe headache.       Comparison study: CT head from 12/15/2009.       Procedure comments: Without contrast axial CT images obtained through the head. Up-to-date CT equipment and radiation dose reduction techniques utilized.       Findings: 1.6 x 1.4 cm extra-axial left frontal partially calcified nodule on series 601 image 26 slightly increased compared to 1.4 x 1.4 cm from 12/15/2009 and most consistent with meningioma.       Mild cerebral parenchymal volume loss. Mild to moderate low attenuation in the subcortical deep and periventricular white matter progressed from 12/15/2019. Probable small remote infarct of the left centrum semiovale on series 601 image 21. Arterial    vascular calcification. No evidence of recent intracranial hemorrhage. Visualized paranasal sinuses clear. Left cataract surgery.           Impression   Impression:   1. No evidence of recent intracranial hemorrhage. 2. Left frontal meningioma slightly increased in size from 12/15/2009. 3. Parenchymal volume loss and white matter signal changes nonspecific but most likely chronic small vessel ischemic changes. 4. 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        ASSESSMENT & PLAN   Patient is a 74 y/o F w/ headaches & known meningioma - most recent imaging show slight (2mm) increase since 2009    Plan:  Unlikely contributing to current headaches  No further inpatient w/u needed  Can follow up for progression monitoring of tumor as outpatient  Thanks for consult  Please call with questions. Patient was discussed with Dr. Ho Miramontes who agrees with above assessment and plan.      Electronically signed by: Serjio Price, 5/6/2019 3:19 PM  Neurosurgery Nurse Practitioner  Phone: [phone]

## 2019-05-06 NOTE — PROGRESS NOTES
[facility]    [facility]              [phone]                       Plan : Will check renal ultrasound  Pr/cr ratio is 1 gm/gm  Echo pending  Has meningioma- headache, anxiety  Increase nifedipine to 60 mg po bid    Assessment :     NEGRITA on CKD III  Cr peaked to 2  But her baseline appears to be between 1.7- 2 for some time now  She is known to have CKD IV-    Known to have membranous nephropathy, hypertension, and took NSAID's in    the past  Cr 1.7 form 1/2017  She used to see Dr Carter Mcnamara, and last saw on 3/5/13, and then started seeing  Me on 3/6/18- and last visit was on 4/18, Then saw  Dr Pushpa Joy at [facility] on    8/18    Renal imaging:   Renal biopsy: 2009- [place]  No urology  Echo: 60-65%- Grade I DD, mild LVH, Normal EF from 2017    Hypertension   BP: (159-190)/(73-78)  Pulse:  []    BP very high  Back on losartan today  On nifedipine    Anemia of CKD  Checking iron studies,   B12/lolate    migraines  Has tremor  Former smoker    SOB  Not sure if she's better  Got a dose of lasix,making urine  Echo pending  Xray- possible vascular congestion          [facility] would like to thank Bishop Galvan MD   for opportunity to serve this patient      Please call with questions at-   24 Hrs Answering service [phone] or  7 am- 5 pm via Perfect serve or cell phone  Dr.Sudhir Elza Hein      CC/reason for consult :     NEGRITA     HPI :     Janeece Nyhan is a 75 y.o. female presented to   the hospital on 5/4/2019 with shortness of   Breath. Complains of headache, depression,  Lethargy. She was seen by PCP yesterday and was told   To go to the hospital.    She is known to have CKD, seen by Dr Gloria De La Cruz   In the hospital multiple times. She didn't follow  Up as advised. We are consulted for CKD and related issues.      Interval History:     Sob getting better  Feeling better    ROS:     Seen with- no family    positives in bold   Constitutional:  fever, chills, weakness, weight change, fatigue  Skin:  rash, pruritus, hair loss, bruising, dry skin, petechiae  Head, Face, Neck   headaches, swelling,  cervical adenopathy  Respiratory: shortness of breath, cough, or wheezing  Cardiovascular: chest pain, palpitations, dizzy, edema  Gastrointestinal: nausea, vomiting, diarrhea, constipation,belly pain    Yellow skin, blood in stool  Musculoskeletal:  back pain, muscle weakness, gait problems,       joint pain or swelling. Genitourinary:  dysuria, poor urine flow, flank pain, blood in urine  Neurologic:  vertigo, TIA'S, syncope, seizures, focal weakness  Psychosocial:  insomnia, anxiety, or depression. Additional positive findings:                          All other remaining systems are negative. PMH/PSH/SH/Family History:     Past Medical History:   Diagnosis Date    Arthritis     Chronic kidney disease     stage 4    COPD [facility]chronic obstructive pulmonary disease) ([facility])     Hyperlipidemia     Hypertension        History reviewed. No pertinent surgical history. Social History     Socioeconomic History    Marital status:       Spouse name: Not on file    Number of children: Not on file    Years of education: Not on file    Highest education level: Not on file   Occupational History    Not on file   Social Needs    Financial resource strain: Not on file    Food insecurity:     Worry: Not on file     Inability: Not on file    Transportation needs:     Medical: Not on file     Non-medical: Not on file   Tobacco Use    Smoking status: Former Smoker     Types: Cigarettes    Smokeless tobacco: Never Used   Substance and Sexual Activity    Alcohol use: No    Drug use: No    Sexual activity: Not on file   Lifestyle    Physical activity:     Days per week: Not on file     Minutes per session: Not on file    Stress: Not on file   Relationships    Social connections:     Talks on phone: Not on file     Gets together: Not on file     Attends Evangelical service: Not on file Active member of club or organization: Not on file     Attends meetings of clubs or organizations: Not on file     Relationship status: Not on file    Intimate partner violence:     Fear of current or ex partner: Not on file     Emotionally abused: Not on file     Physically abused: Not on file     Forced sexual activity: Not on file   Other Topics Concern    Not on file   Social History Narrative    Not on file       History reviewed. No pertinent family history. Medication:     Scheduled Meds:   levothyroxine  112 mcg Oral Daily    losartan  100 mg Oral Daily    rosuvastatin  10 mg Oral Nightly    sertraline  50 mg Oral Daily    sodium chloride flush  10 mL Intravenous 2 times per day    NIFEdipine  30 mg Oral BID     Continuous Infusions:  PRN Meds:.sodium chloride flush, ondansetron, senna, acetaminophen       Vitals :     Vitals:    05/06/19 0756   BP: (!) 190/73   Pulse: 89   Resp:    Temp: 98.2 °F (36.8 °C)   SpO2: 96%       I & O :       Intake/Output Summary (Last 24 hours) at 5/6/2019 9502  Last data filed at 5/6/2019 0856  Gross per 24 hour   Intake 398 ml   Output 1300 ml   Net -902 ml        Physical Examination :     General appearance: Anxious- no, distressed- no, in good spirits- no    lethargic  HEENT: Lips- normal, teeth- ok , oral mucosa- moist  Neck : Mass- no, appears symmetrical, JVD- not visible  Respiratory: Respiratory effort-  Normal at rest, no oxygen, wheeze- no, crackles - no  Cardiovascular:  Ausculation- No M/R/G, Edema 1 +  Abdomen: visible mass- no, distention- no, scar- no, tenderness- no                            hepatosplenomegaly-  no  Musculoskeletal:  clubbing no,cyanosis- no , digital ischemia- no                           muscle strength- grossly normal , tone - grossly normal  Skin: rashes- no , ulcers- no, induration- no, tightening - no  Psychiatric:  Judgement and insight- normal           AAO X 3     LABS:     Recent Labs     05/04/19  1612 05/05/19  0831   WBC 11.0 13.1*   HGB 8.7* 9.5*   HCT 26.7* 29.4*    279     Recent Labs     05/04/19  1612 05/05/19  0831    143   K 3.2* 3.3*    107   CO2 18* 22   BUN 28* 25*   CREATININE 2.0* 2.0*   GLUCOSE 137* 108*   MG  --  2.00

## 2019-05-06 NOTE — PROGRESS NOTES
RESPIRATORY THERAPY ASSESSMENT    Name:  Bishop Alvarenga  Medical Record Number:  [id number]  Age: 75 y.o. Gender: female  : 1945  Today's Date:  2019  Room:  6314/6314-01    Assessment     Is the patient being admitted for a COPD or Asthma exacerbation? No   (If yes the patient will be seen every 4 hours for the first 24 hours and then reassessed)    Patient Admission Diagnosis      Allergies  Allergies   Allergen Reactions    Penicillins Hives    Sulfa Antibiotics        Minimum Predicted Vital Capacity:               Actual Vital Capacity:                    Pulmonary History:COPD  Home Oxygen Therapy:  room air  Home Respiratory Therapy:Albuterol   Current Respiratory Therapy: Duoneb Q4w/a, Pulmicort and Perforomist BID  Treatment Type: HHN  Medications: Albuterol    Respiratory Severity Index(RSI)   Patients with orders for inhalation medications, oxygen, or any therapeutic treatment modality will be placed on Respiratory Protocol. They will be assessed with the first treatment and at least every 72 hours thereafter. The following severity scale will be used to determine frequency of treatment intervention. Smoking History: Pulmonary Disease or Smoking History, Greater than 15 pack year = 2    Social History  Social History     Tobacco Use    Smoking status: Former Smoker     Types: Cigarettes    Smokeless tobacco: Never Used   Substance Use Topics    Alcohol use: No    Drug use: No       Recent Surgical History: None = 0  Past Surgical History  History reviewed. No pertinent surgical history.     Level of Consciousness: Alert, Oriented, and Cooperative = 0    Level of Activity: Walking unassisted = 0    Respiratory Pattern: Regular Pattern; RR 8-20 = 0    Breath Sounds: Absent bilaterally and/or with wheezes = 3    Sputum   ,  , Sputum How Obtained: Nasal  Cough: Strong, spontaneous, non-productive = 0    Vital Signs   BP (!) 182/72   Pulse 94   Temp 98.5 °F (36.9 °C) (Oral)   Resp 19 Ht 5' 4\" (1.626 m)   Wt 149 lb 4 oz (67.7 kg)   SpO2 94%   BMI 25.62 kg/m²   SPO2 (COPD values may differ): Greater than or equal to 92% on room air = 0    Peak Flow (asthma only): not applicable = 0    RSI: 5-6 = Q4hr PRN (every four hours as needed) for dyspnea        Plan       Goals: medication delivery    Patient/caregiver was educated on the proper method of use for Respiratory Care Devices:  Yes      Level of patient/caregiver understanding able to:   ? Verbalize understanding   ? Demonstrate understanding       ? Teach back        ? Needs reinforcement       ? No available caregiver               ? Other:     Response to education:  Excellent     Is patient being placed on Home Treatment Regimen? No     Does the patient have everything they need prior to discharge? NA     Comments: Pt feels better w/ HHN. Plan of Care: Continue HHN Duoneb TID, HHN Pulmicort and Perforomist BID and Albuterol prn. Electronically signed by Elaine Reid RCP on 5/6/2019 at 5:57 PM    Respiratory Protocol Guidelines     1. Assessment and treatment by Respiratory Therapy will be initiated for medication and therapeutic interventions upon initiation of aerosolized medication. 2. Physician will be contacted for respiratory rate (RR) greater than 35 breaths per minute. Therapy will be held for heart rate (HR) greater than 140 beats per minute, pending direction from physician. 3. Bronchodilators will be administered via Metered Dose Inhaler (MDI) with spacer when the following criteria are met:  a. Alert and cooperative     b. HR < 140 bpm  c. RR < 30 bpm                d. Can demonstrate a 2-3 second inspiratory hold  4. Bronchodilators will be administered via Hand Held Nebulizer MAYRA [facility]) to patients when ANY of the following criteria are met  a. Incognizant or uncooperative          b. Patients treated with HHN at Home        c.  Unable to demonstrate proper use of MDI with spacer     d. RR > 30 bpm 5. Bronchodilators will be delivered via Metered Dose Inhaler (MDI), HHN, Aerogen to intubated patients on mechanical ventilation. 6. Inhalation medication orders will be delivered and/or substituted as outlined below. Aerosolized Medications Ordering and Administration Guidelines:    1. All Medications will be ordered by a physician, and their frequency and/or modality will be adjusted as defined by the patients Respiratory Severity Index (RSI) score. 2. If the patient does not have documented COPD, consider discontinuing anticholinergics when RSI is less than 9.  3. If the bronchospasm worsens (increased RSI), then the bronchodilator frequency can be increased to a maximum of every 4 hours. If greater than every 4 hours is required, the physician will be contacted. 4. If the bronchospasm improves, the frequency of the bronchodilator can be decreased, based on the patient's RSI, but not less than home treatment regimen frequency. 5. Bronchodilator(s) will be discontinued if patient has a RSI less than 9 and has received no scheduled or as needed treatment for 72  Hrs. Patients Ordered on a Mucolytic Agent:    1. Must always be administered with a bronchodilator. 2. Discontinue if patient experiences worsened bronchospasm, or secretions have lessened to the point that the patient is able to clear them with a cough. Anti-inflammatory and Combination Medications:    1. If the patient lacks prior history of lung disease, is not using inhaled anti-inflammatory medication at home, and lacks wheezing by examination or by history for at least 24 hours, contact physician for possible discontinuation.

## 2019-05-06 NOTE — PROGRESS NOTES
Progress Note  PGY-1    Hospital Day: 2                                                         Code:DNR-CCA  Admit Date: 5/4/2019                                             PCP: Mani Jeff MD             CC:  shortness of breath                    SUBJECTIVE:     Interval Hx:   Patient still endorse shortness of breath, has not required supplemental oxygen. Feels guilty about ignoring her nephrologist's recommendation about her kidney disease. States she is very anxious. MEDICATIONS:   Scheduled Meds:   NIFEdipine  60 mg Oral BID    NIFEdipine  30 mg Oral Once    levothyroxine  112 mcg Oral Daily    losartan  100 mg Oral Daily    rosuvastatin  10 mg Oral Nightly    sertraline  50 mg Oral Daily    sodium chloride flush  10 mL Intravenous 2 times per day      Continuous Infusions:  PRN Meds:sodium chloride flush, ondansetron, senna, acetaminophen    Allergies: Allergies   Allergen Reactions    Penicillins Hives    Sulfa Antibiotics        PHYSICAL EXAM:       Vitals: BP (!) 190/73   Pulse 89   Temp 98.2 °F (36.8 °C) (Oral)   Resp 20   Ht 5' 4\" (1.626 m)   Wt 149 lb 4 oz (67.7 kg)   SpO2 96%   BMI 25.62 kg/m²     I/O:      Intake/Output Summary (Last 24 hours) at 5/6/2019 1009  Last data filed at 5/6/2019 0856  Gross per 24 hour   Intake 398 ml   Output 1300 ml   Net -902 ml     I/O this shift:  In: 298 [P.O.:288; I.V.:10]  Out: 200 [Urine:200]  I/O last 3 completed shifts:   In: 100 [P.O.:100]  Out: 1200 [Urine:1200]      General: Alert and Oriented, anxious appearing  Eye: PERRL, Normal Conjunctiva  HENT: Normocephalic  Neck: Supple, Non-tender  Respiratory: distant/deminished breath sounds,Non-labored respirations, BS equal, symmetrical expansion, no chest wall tenderness  Cardiovascular: Normal rate, regular rhythm, no murmurs, no gallops, good pulses in all extremities, normal peripheral perfusion, no edema  Gastrointestinal: Soft, Non-tender, non-distended, normal bowel sound  Musculoskeletal: Normal ROM, Normal strength  Neurologic: CN II-XII grossly intact,   Psychiatric: Cooperative, very anxious         DATA:       Labs:  CBC:   Recent Labs     05/04/19  1612 05/05/19  0831   WBC 11.0 13.1*   HGB 8.7* 9.5*   HCT 26.7* 29.4*    279       BMP:   Recent Labs     05/04/19  1612 05/05/19  0831    143   K 3.2* 3.3*    107   CO2 18* 22   BUN 28* 25*   CREATININE 2.0* 2.0*   GLUCOSE 137* 108*     LFT's:   Recent Labs     05/04/19  1612   AST 22   ALT 14   BILITOT <0.2   ALKPHOS 72     Troponin:   Recent Labs     05/04/19  1612   TROPONINI 0.01     BNP: No results for input(s): BNP in the last 72 hours. ABGs: No results for input(s): PHART, QNK1EUG, PO2ART in the last 72 hours. INR: No results for input(s): INR in the last 72 hours. U/A:No results for input(s): NITRITE, COLORU, PHUR, LABCAST, WBCUA, RBCUA, MUCUS, TRICHOMONAS, YEAST, BACTERIA, CLARITYU, SPECGRAV, LEUKOCYTESUR, UROBILINOGEN, BILIRUBINUR, BLOODU, GLUCOSEU, AMORPHOUS in the last 72 hours. Invalid input(s): KETONESU    CT HEAD WO CONTRAST   Final Result   Impression:   1. No evidence of recent intracranial hemorrhage. 2. Left frontal meningioma slightly increased in size from 12/15/2009. 3. Parenchymal volume loss and white matter signal changes nonspecific but most likely chronic small vessel ischemic changes. 4. 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 XR CHEST PORTABLE   Final Result      No focal consolidation. Mild cardiomegaly without pulmonary vascular congestion.                   US RENAL COMPLETE    (Results Pending)         ASSESSMENT AND PLAN:   Serge Escoto is a 75 y.o. female who presented with worsening dyspnea and bilateral leg edema.      Shortness of breath - does not require supplemental oxygen, has not had events of acute de sating, on exam breath sounds diminished/distant  -ECHO ordered  - chest X-ray showing cardiomegaly without pulmonary vascular congestion   -Nephrology and pulmonology consulted     Anemia -TSH checked 5/3, within normal limits  -check iron studies, cortisol, B12, folate  -GI consulted     Meningioma  - incraesed in size since prior imaging, Neurosurgery consulted    NEGRITA on CKD III- membranous nephropathy diagnosed by biopsy  - renal US  - Cr baseline 1.7-2.0      Diarrhea- resolved  -positive hemoccult studies in ER    HTN  -nephrology consult  -home losartan, nifedipine 30mg daily     Headache - patient with history of migraines  -fioricet  - CT head-no acute cranial pathology      Depression/anxiety  -continue home sertraline  - will start Buspar      Hypothyroidism  -home levothyroxine     HLD  -home rosuvastatin     CODE: DNR-CCA  FEN: renal diet  PPX: SCDs; no DVT chemoprophylaxis given positive hemoccult testing  DISPO: wards     Will discuss with Dr. Brittany Price and medical team  Arpit Stafford MD PGY-1  5/6/2019,  10:09 AM       Patient seen and examined, plan of care discussed with residents. Agree with their assessment and plan with following addendum:  Patient reports multiple complaints. She is not sure if her breathing was better after lasix. She stopped using her advair recently due to high costs. On exam- has leg edema. She is not sure if that's worse of her baseline. She is also on CCB for blood pressure. It sounds like acute viral illness made all of her chronic problems worse. Will ask pulmonology to evaluate. She appears more fluid overloaded with edema and diminished lung exam, but has progressive CKD, have to be cautious with diuretics. Also has COPD diagnosis and stopped using her ICS/LABA.        Camryn Jimenez

## 2019-05-07 NOTE — PLAN OF CARE
Patient is a fall risk. See Fall Risk Assessment for details. Bed is in low, lock position; call bell/belongings within reach. Calls appropriately when assistance is needed. Hourly rounds and bed alarm in place for safety. Will continue to monitor and reassess throughout shift. Pt reports adequate control of pain symptoms with use of currently prescribed prn and scheduled pain medications. Pt educated on pain medications including dose, frequency and side effects. Pt using verbal 0-10 pain scale without difficulty. Pt encouraged to notify staff of pain.

## 2019-05-07 NOTE — CONSULTS
Neurosurgery Consult:    Patient Name: Francia Ely YOB: 1945   Sex: Female Age: 75 yrs     Medical Record Number: [id number] Acct Number: [de-identified]   Room Number: 4504/5185-22 Hospital Day: Hospital Day: 4     Requesting physician: Dejah Chandra MD    Reason for consultation: Meningioma    History of present illness: Patient is a 75 y.o. female w/ PMH of HTN, HLD COPD, CKD, arthritis who presented on 5/7/2019 worsening dyspnea, BLE edema, severe headaches. Head CT obtained in w/u for headaches and shows meningioma slightly (2mm) larger than most recent image from 2009. No notable surrounding edema. Patient states she has hx of migraines, however headaches over last few days have been much worse not like her typical migraine pain. ROS:   Endorses headaches  Denies vision changes  +SOB  known about meningioma for many years       VITAL SIGNS   BP (!) 177/80   Pulse 93   Temp 97.8 °F (36.6 °C) (Oral)   Resp 16   Ht 5' 4\" (1.626 m)   Wt 149 lb 4 oz (67.7 kg)   SpO2 97%   BMI 25.62 kg/m²    Height Height: 5' 4\" (162.6 cm)   Weight Weight: 149 lb 4 oz (67.7 kg)        Allergies Allergies   Allergen Reactions    Penicillins Hives    Sulfa Antibiotics       NPO Status DIET RENAL;   Isolation No active isolations     MEDICAL HISTORY   Past Medical History       Diagnosis Date    Arthritis     Chronic kidney disease     stage 4    COPD (chronic obstructive pulmonary disease) (HCC)     Hyperlipidemia     Hypertension       Surgical History    has no past surgical history on file. Social History   Social History     Occupational History    Not on file   Tobacco Use    Smoking status: Former Smoker     Types: Cigarettes    Smokeless tobacco: Never Used   Substance and Sexual Activity    Alcohol use: No    Drug use: No    Sexual activity: Not on file        The medical history was obtained from the patient & the medical records.  The nursing notes, primary physician's notes, and old charts (if applicable) were reviewed. LABS   Basic Metabolic Profile Recent Labs     05/04/19  1612 05/05/19  0831 05/07/19  0757    143 144    107 109   CO2 18* 22 22   BUN 28* 25* 26*   CREATININE 2.0* 2.0* 2.0*   GLUCOSE 137* 108* 102*   PHOS  --   --  2.9   MG  --  2.00 1.80      Complete Blood Count Recent Labs     05/04/19  1612 05/05/19  0831 05/07/19  0757   WBC 11.0 13.1* 7.4   RBC 2.68* 2.95* 2.61*      Coagulation Studies No results for input(s): PTT, INR in the last 72 hours. Invalid input(s): PLATELETS, PROA, PT, PTTA     MEDICATIONS   Inpatient Medications     furosemide, 20 mg, Intravenous, BID    NIFEdipine, 60 mg, Oral, BID    NIFEdipine, 30 mg, Oral, Once    budesonide, 0.5 mg, Nebulization, BID    formoterol, 20 mcg, Nebulization, BID    ipratropium-albuterol, 1 ampule, Inhalation, TID    levothyroxine, 112 mcg, Oral, Daily    losartan, 100 mg, Oral, Daily    rosuvastatin, 10 mg, Oral, Nightly    sertraline, 50 mg, Oral, Daily    sodium chloride flush, 10 mL, Intravenous, 2 times per day   Infusions      PRN     sodium chloride flush 10 mL Intravenous PRN   ondansetron 4 mg Intravenous Q6H PRN   senna 1 tablet Oral Daily PRN   acetaminophen 650 mg Oral Q4H PRN      Antibiotics   Recent Abx Admin      No antibiotic orders with administrations found. PHYSICAL EXAMINATION     Patient seen and examined   General: Well developed. Alert and cooperative in no acute distress. HENT: atraumatic, neck supple  Eyes: Optic discs: Not tested  Pulmonary: unlabored respiratory effort  Cardiovascular:  Warm well perfused.  No peripheral edema  Gastrointestinal: abdomen soft, ND    Neurologic Exam:  Neurological:  Mental Status: Awake, alert, oriented x 4, speech clear and appropriate  Attention: Intact  Language: No aphasia or dysarthria noted  Sensation: Intact to all extremities to light touch  Coordination: Intact    Cranial Nerves:  Cranial Nerves:  II: Visual acuity not tested, denies new visual changes / diplopia  III, IV, VI: PERRL, 3 mm bilaterally, EOMI, no nystagmus noted  V: Facial sensation intact bilaterally to touch  VII: Face symmetric  VIII: Hearing intact bilaterally to spoken voice  IX: Palate movement equal bilaterally  XI: Shoulder shrug equal bilaterally  XII: Tongue midline    Musculoskeletal:   Gait: Not tested   Assist devices: None   Tone: normal   Motor strength:    Right  Left    Right  Left    Deltoid  5 5  Hip Flex  5 5   Biceps  5 5  Knee Extensors  5 5   Triceps  5 5  Knee Flexors  5 5   Wrist Ext  5 5  Ankle Dorsiflex. 5 5   Wrist Flex  5 5  Ankle Plantarflex. 5 5   Handgrip  5 5  Ext Hayden Longus  5 5   Thumb Ext  5 5         IMAGING     I personally reviewed the patient's imaging which consists of a CT dated 5/6/2019. This demonstrates a partially calcified left frontal convexity lesion consistent with a meningioma. The lesion is minimally enlarged in comparison to 12/15/2009. ASSESSMENT & PLAN   74 y/o F w/ headaches & known meningioma - most recent imaging show slight (2mm) increase since 2009    Plan:  Unlikely contributing to current headaches  No further inpatient w/u needed  Can follow up for progression monitoring of tumor as outpatient. Follow up with MRI in 2 years. Thanks for consult  Please call with questions.      Zulay Guerrero MD, PhD  [address], [address] [place] [address], [address] [phone] (c), [phone] (o)

## 2019-05-07 NOTE — PROGRESS NOTES
CREATININE 2.0* 2.0*  --  2.0*     No results for input(s): PHART, YFJ4DCA, PO2ART in the last 72 hours. LIVER PROFILE:   Recent Labs     05/04/19  1612   AST 22   ALT 14   LIPASE 34.0   BILIDIR <0.2   BILITOT <0.2   ALKPHOS 72       CXR REVIEWED BY ME AND SHOWED:  VL Extremity Venous Bilateral         US RENAL COMPLETE   Final Result      1. Negative. CT HEAD WO CONTRAST   Final Result   Impression:   1. No evidence of recent intracranial hemorrhage. 2. Left frontal meningioma slightly increased in size from 12/15/2009. 3. Parenchymal volume loss and white matter signal changes nonspecific but most likely chronic small vessel ischemic changes. 4. 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 XR CHEST PORTABLE   Final Result      No focal consolidation. Mild cardiomegaly without pulmonary vascular congestion. CT ABDOMEN W CONTRAST Additional Contrast? Oral    (Results Pending)        ASSESSMENT/PLAN:  This is a 75 y.o. female with tobacco abuse disorder, and multiple complaints. Patient feeling diffusely ill but without clear explanation. Breathing appears to be at baseline. CKD is stable with creatinine of 2. Plan is for a CT abdomen with oral contrast    Given card to follow up in my office after discharge for workup of her smoking related lung disease. No further inpatient pulmonary workup needed. Will sign off, please call with questions.     Blaise Mayo MD

## 2019-05-07 NOTE — PROGRESS NOTES
Physical Therapy    Facility/Department: [address] [facility] [address]  Initial Assessment/Treatment    NAME: Bishop Alvarenga  : 1945  MRN: [id number]    Date of Service: 2019    Discharge Recommendations:    Bishop Alvarenga scored a 18/24 on the AM-PAC short mobility form. Current research shows that an AM-PAC score of 18 or greater is typically associated with a discharge to the patient's home setting. Based on the patients AM-PAC score and their current functional mobility deficits, it is recommended that the patient have 2-3 sessions per week of Physical Therapy at d/c to increase the patients independence. HOME HEALTH CARE: LEVEL 1 STANDARD    - Initial home health evaluation to occur within 24-48 hours, in patient home   - Therapy to evaluate with goal of regaining prior level of functioning   - Therapy to evaluate if patient has [address] needs for personal care    PT Equipment Recommendations  Equipment Needed: No  Other: pt has RW at home she can use    Assessment   Body structures, Functions, Activity limitations: Decreased functional mobility ; Decreased endurance;Decreased ROM; Decreased balance;Decreased strength  Assessment: 75 y.o. female w/ PMH of HTN, HLD COPD, CKD, arthritis who presented on 2019 worsening dyspnea, BLE edema, severe headaches. Pt currently requiring CGA for all functional mobility and use of RW for safe mobility. Pt is below her baseline and will require further skilled PT to return to PLOF. PT planning to d/c home with 24hr A from friend and HHPT. Will continue to follow.    Treatment Diagnosis: Decreased functional mobility and endurance  Prognosis: Good;Fair  Decision Making: Medium Complexity  Patient Education: role of PT, use of call light, d/c planning; pt verb understanding  Barriers to Learning: none  REQUIRES PT FOLLOW UP: Yes  Activity Tolerance  Activity Tolerance: Patient limited by fatigue;Patient limited by endurance       Patient Diagnosis(es): The primary encounter diagnosis was SOB (shortness of breath). Diagnoses of Heme positive stool and Hypervolemia, unspecified hypervolemia type were also pertinent to this visit. has a past medical history of Arthritis, Chronic kidney disease, COPD (chronic obstructive pulmonary disease) ([address] Hyperlipidemia, and Hypertension. has no past surgical history on file. Restrictions  Position Activity Restriction  Other position/activity restrictions: up with assist   Vision/Hearing  Vision: Impaired  Vision Exceptions: Wears glasses at all times  Hearing: Within functional limits     Subjective  General  Chart Reviewed: Yes  Additional Pertinent Hx: 75 y.o. female w/ PMH of HTN, HLD COPD, CKD, arthritis who presented on 5/6/2019 worsening dyspnea, BLE edema, severe headaches. Head CT obtained in w/u for headaches and shows meningioma slightly (2mm) larger than most recent image from 2009  Family / Caregiver Present: No  Referring Practitioner: Marcin David MD  Diagnosis: SOB  Follows Commands: Within Functional Limits  Subjective  Subjective: Pt found sitting EOB finishing breathing tx, denying pain and agreeable to therapy.    Pain Screening  Patient Currently in Pain: Denies          Orientation  Orientation  Overall Orientation Status: Within Functional Limits  Social/Functional History  Social/Functional History  Lives With: Alone(Roommate recently passed away)  Type of Home: [address] [place] [address],[address]: One level(with basement)  Home Access: Stairs to enter with rails  Entrance Stairs - Number of Steps: 4 JUAN in front, 7 JUAN in back (prefers to go in back entrance but recently started going in front)  Bathroom Shower/Tub: Tub/Shower unit  Bathroom Toilet: Standard(Uses window sill for leverage)  Bathroom Equipment: Grab bars in shower, Shower chair  Home Equipment: Rolling walker  Receives Help From: Friend(s)  ADL Assistance: Independent  Homemaking Assistance: Independent  Ambulation Assistance: Independent  Transfer Assistance: Independent  Active : Yes  Additional Comments: Denies falls in past 6 mo  Cognition        Objective          AROM RLE (degrees)  RLE AROM: WFL  AROM LLE (degrees)  LLE AROM : WFL  Strength RLE  Comment: Globally 4/5  Strength LLE  Comment: Globally 4/5        Bed mobility  Comment: NT- Pt sitting EOb finishing breathing tx upon arrival.   Transfers  Sit to Stand: Contact guard assistance(from EOB x2 trials)  Stand to sit: Contact guard assistance(to EOB, to recliner)  Ambulation  Ambulation?: Yes  Ambulation 1  Surface: level tile  Device: Rolling Walker  Assistance: Contact guard assistance  Quality of Gait: slow judith, moderate stride length, moderate Alexis. Overall fairly steady gait. No LOB or near LOB   Distance: 30' in room   Comments: Pt fatiguing with amb   Stairs/Curb  Stairs?: No     Balance  Posture: Fair  Sitting - Static: Good  Sitting - Dynamic: Good  Standing - Static: Fair;+  Standing - Dynamic: Fair;+(with RW and CGA)      PT evaluation and treatment initiated. Treatment included gait and transfer training as well as patient education.     Plan   Plan  Times per week: 2-5  Times per day: Daily  Current Treatment Recommendations: Strengthening, ROM, Stair training, Balance Training, Functional Mobility Training, Endurance Training, Transfer Training, Safety Education & Training, Gait Training  Safety Devices  Type of devices: Call light within reach, Left in chair, Chair alarm in place, Nurse notified, Gait belt      AM-PAC Score  AM-PAC Inpatient Mobility Raw Score : 18  AM-PAC Inpatient T-Scale Score : 43.63  Mobility Inpatient CMS 0-100% Score: 46.58  Mobility Inpatient CMS G-Code Modifier : CK          Goals  Short term goals  Time Frame for Short term goals: d/c   Short term goal 1: sup<>sit independent  Short term goal 2: sit<>stand with RW and supervision   Short term goal 3: amb 48' with RW and supervision   Short term goal 4: ascend/descend 3 steps with CGA  Patient Goals

## 2019-05-07 NOTE — PROGRESS NOTES
[address] [place] [address]  GI Progress Note        Jared Conner is a 75 y.o. female patient. 1. SOB (shortness of breath)    2. Heme positive stool    3. Hypervolemia, unspecified hypervolemia type        Admit Date: 2019    Subjective:       C/O SOB, had right flank pain last PM, abd not painful, no N/V    Scheduled Meds:   NIFEdipine  60 mg Oral BID    NIFEdipine  30 mg Oral Once    budesonide  0.5 mg Nebulization BID    formoterol  20 mcg Nebulization BID    ipratropium-albuterol  1 ampule Inhalation TID    levothyroxine  112 mcg Oral Daily    losartan  100 mg Oral Daily    rosuvastatin  10 mg Oral Nightly    sertraline  50 mg Oral Daily    sodium chloride flush  10 mL Intravenous 2 times per day       Objective:       Patient Vitals for the past 24 hrs:   BP Temp Temp src Pulse Resp SpO2   19 0623 (!) 181/87 97.8 °F (36.6 °C) Oral 93 24 96 %   19 0045 (!) 182/91 98.2 °F (36.8 °C) Oral 95 16 --   19 2042 (!) 164/73 97.9 °F (36.6 °C) Oral 96 18 97 %   19 2020 -- -- -- -- 16 96 %   19 1730 -- -- -- -- -- 94 %   19 1617 (!) 182/72 98.5 °F (36.9 °C) Oral 94 19 93 %   19 1149 (!) 162/80 98.3 °F (36.8 °C) Oral 93 20 95 %   19 0756 (!) 190/73 98.2 °F (36.8 °C) Oral 89 -- 96 %       Exam:  VITALS:  BP (!) 181/87   Pulse 93   Temp 97.8 °F (36.6 °C) (Oral)   Resp 24   Ht 5' 4\" (1.626 m)   Wt 149 lb 4 oz (67.7 kg)   SpO2 96%   BMI 25.62 kg/m²   TEMPERATURE:  Current - Temp: 97.8 °F (36.6 °C);  Max - Temp  Av.2 °F (36.8 °C)  Min: 97.8 °F (36.6 °C)  Max: 98.5 °F (36.9 °C)    NAD  General appearance: alert, appears stated age, cooperative and no distress  Abdomen: soft, non-tender; bowel sounds normal; no masses,  no organomegaly, no rash on right flank  AAOx3, No asterixis or encephalopathy        Recent Labs     19  1612 19  0831   WBC 11.0 13.1*   HGB 8.7* 9.5*   HCT 26.7* 29.4*   MCV 99.5 99.4    279     Recent Labs 05/04/19  1612 05/05/19  0831 05/06/19  1516    143  --    K 3.2* 3.3* 3.3*    107  --    CO2 18* 22  --    BUN 28* 25*  --    CREATININE 2.0* 2.0*  --      Recent Labs     05/04/19  1612   AST 22   ALT 14   BILIDIR <0.2   BILITOT <0.2   ALKPHOS 72     Assessment:       Active Problems:    Shortness of breath  Resolved Problems:    * No resolved hospital problems. *  Acute on chronic anemia  Heme positive stool    Recommend EGD and colonoscopy, patient does not want to do while here and is refusing. Discussed possibility of missing a significant lesion, malignancy, potential for significant GI bleeding and she still does not want to do now or while in hospital. I stressed that this needs to be done and not to decide after DC to skip the procedures.  She states, \"I will assume the risk\"    Recommendations:       See her in office in 10-14 days to set up EGD and colonoscopy  Please recall if she changes her mind about doing procedures here, will sign off    Lisa Chappell  5/7/2019  6:48 AM

## 2019-05-07 NOTE — PROGRESS NOTES
[facility]    [facility] [place] [place]              [phone]                       Plan :     Start lasix 40 mg iv daily, likely should be enough  And also will need po from tomorrow  Will start venofer for iron deficiency  Low potassium- replace      Assessment :     NEGRITA on CKD IV  Cr peaked to 2  But her baseline appears to be between 1.7- 2 for some time now  She is known to have CKD IV-    Known to have membranous nephropathy, hypertension, and took NSAID's in    the past  Cr 1.7 form 1/2017  She used to see Dr Scott Beltran, and last saw on 3/5/13, and then started seeing  Me on 3/6/18- and last visit was on 4/18, Then saw  Dr Ash Arrieta at [facility] on    8/18    Renal imaging:   Renal biopsy: 2009- [place]  No urology  Echo: 60-65%- Grade I DD, mild LVH, Normal EF from 2017    Hypertension   BP: (181)/(87)  Pulse:  [93]    BP very high  losartan today  On nifedipine  Starting lasix on 5/7/19    Anemia of CKD  Ferritin - 60  Iron sat is 25    Migraines  Has meningioma  Has tremor  Former smoker    SOB  Not sure if she's better  Got a dose of lasix,making urine  Echo : 5/19- EF 55- 60%, mild MR, mild TR   mild to mod concentric LVH  Has smoking history- plan for outpatient PFT  Xray- possible vascular congestion          [facility] would like to thank Paul Brian MD   for opportunity to serve this patient      Please call with questions at-   24 Hrs Answering service [phone] or  7 am- 5 pm via Perfect serve or cell phone  Dr.Sudhir Ana Acuña      CC/reason for consult :     NEGRITA     HPI :     From consult note-     Francia Ely is a 75 y.o. female presented to   the hospital on 5/4/2019 with shortness of   Breath. Complains of headache, depression,  Lethargy. She was seen by PCP yesterday and was told   To go to the hospital.    She is known to have CKD, seen by Dr Mark Huertas   In the hospital multiple times. She didn't follow  Up as advised. We are consulted for CKD and related issues.      Interval History:     Sob getting better  Feeling better    ROS:     Seen with- no family  SOB- none  Edema- none  Nausea/vomiting- none  Poor appetite-No  Confusion- no  Urinary complaints- no  Any other complaints- no  All other ROS are reviewed and are Negative       Medication:     Scheduled Meds:   NIFEdipine  60 mg Oral BID    NIFEdipine  30 mg Oral Once    budesonide  0.5 mg Nebulization BID    formoterol  20 mcg Nebulization BID    ipratropium-albuterol  1 ampule Inhalation TID    levothyroxine  112 mcg Oral Daily    losartan  100 mg Oral Daily    rosuvastatin  10 mg Oral Nightly    sertraline  50 mg Oral Daily    sodium chloride flush  10 mL Intravenous 2 times per day     Continuous Infusions:  PRN Meds:.sodium chloride flush, ondansetron, senna, acetaminophen       Vitals :     Vitals:    05/07/19 0835   BP:    Pulse:    Resp: 20   Temp:    SpO2: 97%       I & O :       Intake/Output Summary (Last 24 hours) at 5/7/2019 0904  Last data filed at 5/7/2019 0657  Gross per 24 hour   Intake --   Output 1200 ml   Net -1200 ml        Physical Examination :     General appearance: Anxious- no, distressed- no, in good spirits- no    lethargic  HEENT: Lips- normal, teeth- ok , oral mucosa- moist  Neck : Mass- no, appears symmetrical, JVD- not visible  Respiratory: Respiratory effort-  Normal at rest, no oxygen, wheeze- no, crackles - no  Cardiovascular:  Ausculation- No M/R/G, Edema 1 +  Abdomen: visible mass- no, distention- no, scar- no, tenderness- no                            hepatosplenomegaly-  no  Musculoskeletal:  clubbing no,cyanosis- no , digital ischemia- no                           muscle strength- grossly normal , tone - grossly normal  Skin: rashes- no , ulcers- no, induration- no, tightening - no  Psychiatric:  Judgement and insight- normal           AAO X 3     LABS:     Recent Labs     05/04/19  1612 05/05/19  0831 05/07/19  0757   WBC 11.0 13.1* 7.4   HGB 8.7* 9.5* 8.4*   HCT 26.7* 29.4* 25.6*  279 232     Recent Labs     05/04/19  1612 05/05/19  0831 05/06/19  1516 05/07/19  0757    143  --  144   K 3.2* 3.3* 3.3* 3.0*    107  --  109   CO2 18* 22  --  22   BUN 28* 25*  --  26*   CREATININE 2.0* 2.0*  --  2.0*   GLUCOSE 137* 108*  --  102*   MG  --  2.00  --   --    PHOS  --   --   --  2.9

## 2019-05-07 NOTE — PROGRESS NOTES
Progress Note  PGY-1    Hospital Day: 2                                                         Code:DNR-CCA  Admit Date: 5/4/2019                                             PCP: Eileen Price MD             CC:  shortness of breath                    SUBJECTIVE:     Interval Hx:   No acute events overnight. Pt seen and examined at bedside. Feels ok, denies significant sob, better with breathing treatments. Endorses come generalized pain. MEDICATIONS:   Scheduled Meds:   NIFEdipine  60 mg Oral BID    NIFEdipine  30 mg Oral Once    budesonide  0.5 mg Nebulization BID    formoterol  20 mcg Nebulization BID    ipratropium-albuterol        ipratropium-albuterol  1 ampule Inhalation TID    levothyroxine  112 mcg Oral Daily    losartan  100 mg Oral Daily    rosuvastatin  10 mg Oral Nightly    sertraline  50 mg Oral Daily    sodium chloride flush  10 mL Intravenous 2 times per day      Continuous Infusions:  PRN Meds:sodium chloride flush, ondansetron, senna, acetaminophen    Allergies: Allergies   Allergen Reactions    Penicillins Hives    Sulfa Antibiotics        PHYSICAL EXAM:       Vitals: BP (!) 182/91   Pulse 95   Temp 98.2 °F (36.8 °C) (Oral)   Resp 16   Ht 5' 4\" (1.626 m)   Wt 149 lb 4 oz (67.7 kg)   SpO2 97%   BMI 25.62 kg/m²     I/O:      Intake/Output Summary (Last 24 hours) at 5/7/2019 0252  Last data filed at 5/7/2019 0031  Gross per 24 hour   Intake 348 ml   Output 1400 ml   Net -1052 ml     I/O this shift:  In: -   Out: 175 [Urine:175]  I/O last 3 completed shifts: In: 348 [P.O.:338;  I.V.:10]  Out: 1575 [Urine:1525; Stool:50]    General: Alert and Oriented, no acute distress  Eye: PERRL, Normal Conjunctiva  HENT: Normocephalic  Neck: Supple, Non-tender  Respiratory: distant/deminished breath sounds,Non-labored respirations, BS equal, symmetrical expansion, no chest wall tenderness  Cardiovascular: Normal rate, regular rhythm, no murmurs, no gallops, good pulses in all presented with worsening dyspnea and bilateral leg edema.      Fluid overload, multifactorial - does not require supplemental oxygen, has not had events of acute de-sating, on exam breath sounds diminished/distant. Edema present in legs. Has membranous nephropathy as well as spinal deformity, all likely contributing.  - ECHO showed LVED 47-60%, normal diastolic fxn. Pulmonary hypertension present with PASP of 52 mmHg  - chest X-ray showing cardiomegaly without pulmonary vascular congestion   - Nephrology consulted, renal ultrasound negative. Starting lasix 40 IV daily. - pulmonology consulted, will get low dose screening CT for pulm nodules as outpt as well as PFTs. Advise to continue diuresis    Abdominal pain, unclear etiology - significantly tender to palpation  - f/u CT abd/pelvis w oral contrast     Anemia -TSH checked 5/3, within normal limits  - Cortisol, B12 normal  - starting Venofer for iron deficiency. - f/u folate  - GI consulted, will need EGD/colonoscopy    Meningioma, stable  - incraesed in size (by 2 mm) since prior imaging in 2009, Neurosurgery consulted, no further inpatient workup needed. Follow up outpatient    NEGRITA on CKD III- membranous nephropathy diagnosed by biopsy  - renal US negative  - Cr baseline 1.7-2.0      Diarrhea- resolved  -positive hemoccult studies in ER    HTN  -nephrology consult  -home losartan 100, nifedipine 30mg daily     Headache - patient with history of migraines  - fioricet  - CT head-no acute cranial pathology      Depression/anxiety  -continue home sertraline  - will start Buspar      Hypothyroidism  -home levothyroxine     HLD  -home rosuvastatin     CODE: DNR-CCA  FEN: renal diet  PPX: SCDs; no DVT chemoprophylaxis given positive hemoccult testing  DISPO: wards     Will discuss with Dr. Rober Peck and medical team    Jese Simon MD PGY-1  5/7/2019,  2:52 AM       Patient seen and examined, plan of care discussed with residents.   Agree with their assessment and plan with following addendum:  Patient is not sure if she feels better today. She appeared comfortable being observed from the distance. Starting lasix today. Cont with bronchodilators. She declined GI evalution of her anemia and FOBT positive stools. She does report RLQ abd pain. Has no recent abdominal imaging. Will obtain CT.        Sharon King

## 2019-05-07 NOTE — PROGRESS NOTES
breath). Diagnoses of Heme positive stool and Hypervolemia, unspecified hypervolemia type were also pertinent to this visit. has a past medical history of Arthritis, Chronic kidney disease, COPD (chronic obstructive pulmonary disease) ([address] Hyperlipidemia, and Hypertension. has no past surgical history on file. Treatment Diagnosis: Impaired ADLs and functional mobility      Restrictions  Position Activity Restriction  Other position/activity restrictions: up with assist     Subjective   General  Chart Reviewed: Yes  Additional Pertinent Hx: PMH:  CKD, HTN, hyperlipidemia, COPD  Family / Caregiver Present: Yes(FriendCresenciian Meeks)  Referring Practitioner: Dr. 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 Pain Assessment  Pain Assessment: Denies    Social/Functional History  Social/Functional History  Lives With: Alone(Roommate recently passed away)  Type of Home: House  Home Layout: One level(with basement)  Home Access: Stairs to enter with rails  Entrance Stairs - Number of Steps: 4 JUAN in front, 7 JUAN in back (prefers to go in back entrance but recently started going in front)  Bathroom Shower/Tub: Tub/Shower unit  Bathroom Toilet: Standard(Uses window sill for leverage)  Bathroom Equipment: Grab bars in shower, Shower chair  Home Equipment: Rolling walker, Grab bars(Did not previously use)  Receives Help From: Friend(s)  ADL Assistance: Independent  Homemaking Assistance: Independent  Ambulation Assistance: Independent  Transfer Assistance: Independent  Active :  Yes  Additional Comments: Denies falls in past 6 mo       Objective Vision: Impaired  Vision Exceptions: Wears glasses at all times  Hearing: Within functional limits    Orientation  Overall Orientation Status: Within Functional Limits     Balance  Sitting Balance: Contact guard assistance(SBA for static sitting, CGA for dynamic sitting with clothing management)  Standing Balance: Stand by assistance  Standing Balance  Time: ~2 min  Activity: Functional mobility in room and hand hygiene at sink  Functional Mobility  Functional - Mobility Device: Rolling Walker  Activity: Other(To/from sink in room)  Assist Level: Contact guard assistance  Functional Mobility Comments: ~30 ft   ADL  Feeding: Independent(Eating sorbet )  Grooming: Stand by assistance(Standing at sink for hand hygiene, holding onto sink for stability)  LE Dressing: Contact guard assistance(Stood from EOB to pull up pants)  Tone RUE  RUE Tone: Normotonic  Tone LUE  LUE Tone: Normotonic  Coordination  Movements Are Fluid And Coordinated: No  Pt tremulous throughout body, increased tremors with mobility        Transfers  Sit to stand: Contact guard assistance(from bed x2)  Stand to sit: Contact guard assistance(to bed, to chair)     Cognition  Overall Cognitive Status: WFL                 LUE AROM (degrees)  LUE AROM : WFL  Left Hand AROM (degrees)  Left Hand AROM: WFL  RUE AROM (degrees)  RUE AROM : WFL  Right Hand AROM (degrees)  Right Hand AROM: WFL             Patient participated in OT eval and tx including ADLs and transfer          Plan   Plan  Times per week: 2-5x  Times per day: Daily  Current Treatment Recommendations: Strengthening, Safety Education & Training, Self-Care / ADL, Functional Mobility Training, Endurance Training      AM-PAC Score        AM-[facility] Inpatient Daily Activity Raw Score: 19  AM-PAC Inpatient ADL T-Scale Score : 40.22  ADL Inpatient CMS 0-100% Score: 42.8  ADL Inpatient CMS G-Code Modifier : CK    Goals  Short term goals  Time Frame for Short term goals: By d/c  Short term goal 1: to increase standing tolerance to 3 min to complete grooming task  Short term goal 2: verbalize 2 energy conservation techniques with min A  Short term goal 3: assess toilet transfer  Short term goal 4: LB dressing with SBA  Patient Goals   Patient goals : To return home       Therapy Time   Individual Concurrent Group Co-treatment   Time In 1310         Time Out 1355         Minutes 45           Time coded treatment minutes: 30    Total Treatment time: [id number] [facility], [address] [place] [address], OTR/L 0872  Therapist was present, directed the patient's care, made skilled judgement, was responsible for assessment and treatment of the patient. If pt is discharged prior to next treatment, this note to serve as discharge summary.

## 2019-05-08 NOTE — PROGRESS NOTES
Physical Therapy    Daily Treatment Note      Assessment:  Decreased activity tolerance today. Overall pt reports feeling poorly compared to yesterday. Pt needing SBA/CGA for transfers/mobility. Pt is hopeful once she is feeling better that she will have no concerns about returning home upon d/c. Discharge Recommendations:  Oli Cueva scored a 19/24 on the AM-PAC short mobility form. Current research shows that an AM-PAC score of 18 or greater is typically associated with a discharge to the patient's home setting. Based on the patients AM-PAC score and their current functional mobility deficits, it is recommended that the patient have 2-3 sessions per week of Physical Therapy at d/c to increase the patients independence. Equipment Needs:  None anticipated    Chart Reviewed: Yes   Other position/activity restrictions: up with assist    Additional Pertinent Hx: 75 y.o. female w/ PMH of HTN, HLD COPD, CKD, arthritis who presented on 5/6/2019 worsening dyspnea, BLE edema, severe headaches. Head CT obtained in w/u for headaches and shows meningioma slightly (2mm) larger than most recent image from 2009      Diagnosis: SOB   Treatment Diagnosis: Decreased functional mobility and endurance      Subjective:  Pt found supine in bed. \"I am having a bad day. \"  Pt endorses feelings of nausea and headache. RN made aware. Pt declining to participate in PT this date. \"I just can't right now. \"  Upon exiting room, pt requesting to use the bathroom. Pain:  Headache pain, not rated, RN made aware.       Objective:    Bed mobility  Supine to sit:  Supervision (HOB raised)  Sit to Supine:  Supervision (HOB raised)    Transfers  Sit to stand:  SBA (from EOB to walker, cues for safety)  Stand to sit:  SBA (cues for safety)    Ambulation  Assistance Level:  CGA  Assistive device:  Rolling walker  Distance:  10ft x2  Quality of gait:  Flexed posture, shaky gait, fairly steady  Other:  Pt refused gait belt    Neuro/balance  Standing balance:  SBA/CGA overall with UE support     Patient Education  DC recommendations. Pt is hopeful once she is feeling better that she will be able to manage at home. Safety Devices  Pt left with needs in reach, supine in bed with alarm in place and RN aware. (Pt declined to sit in chair)        Assessment:  Decreased activity tolerance today. Overall pt reports feeling poorly compared to yesterday. Pt needing SBA/CGA for transfers/mobility. Pt is hopeful once she is feeling better that she will have no concerns about returning home upon d/c. AM-PAC score  AM-PAC Inpatient Mobility Raw Score : 19  AM-PAC Inpatient T-Scale Score : 45.44  Mobility Inpatient CMS 0-100% Score: 41.77  Mobility Inpatient CMS G-Code Modifier : CK    Goals:  Goals not met this treatment, continue with current goals. Time Frame for Short term goals: d/c   Short term goal 1: sup<>sit independent   Short term goal 2: sit<>stand with RW and supervision    Short term goal 3: amb 48' with RW and supervision   Short term goal 4: ascend/descend 3 steps with CGA       Plan:  Times per week: 2-5; Times per day: Daily  Current Treatment Recommendations: Strengthening, ROM, Stair training, Balance Training, Functional Mobility Training, Endurance Training, Transfer Training, Safety Education & Training, Gait Training    Therapy Time    Individual  Concurrent  Group  Co-treatment    Time In  1310            Time Out  1334            Minutes  24              Timed Code Treatment Minutes:  24  Total Treatment Minutes:  24      If patient is discharged prior to next treatment, this note will serve as the discharge summary.     Irma Cortes PT

## 2019-05-08 NOTE — PLAN OF CARE
Problem: Falls - Risk of:  Goal: Will remain free from falls  Description  Will remain free from falls  Outcome: Ongoing  Note:   Patient's bed alarm on, answering the call light when needs to go to the restroom, non-skid socks, call light with in reach, bed at lowest position. Will continue to monitor the patient. Problem: Falls - Risk of:  Goal: Absence of physical injury  Description  Absence of physical injury  Outcome: Ongoing     Problem: Pain:  Goal: Pain level will decrease  Description  Pain level will decrease  Outcome: Ongoing  Note:   Patient stating a 3 out of 10 pain gave tylenol said that it helped. Will continue to monitor the pain.       Problem: Pain:  Goal: Control of acute pain  Description  Control of acute pain  Outcome: Ongoing     Problem: Pain:  Goal: Control of chronic pain  Description  Control of chronic pain  Outcome: Ongoing

## 2019-05-08 NOTE — PLAN OF CARE
Problem: Falls - Risk of:  Goal: Will remain free from falls  Description  Will remain free from falls  5/8/2019 1526 by Maricruz Todd RN  Outcome: Ongoing  Note:   Bed locked in lowest position, plugged in with alarm on. Pt ambulate to bathroom with standby assist. Pt calls appropriately. 3/4 Bed rails up. Call light within reach. Pt belongings within reach. Bedside table within reach. Pt instructed to use call light prior to getting up. Will continue to monitor. 5/8/2019 0245 by Milady Crews RN  Outcome: Ongoing     Problem: Risk for Impaired Skin Integrity  Goal: Tissue integrity - skin and mucous membranes  Description  Structural intactness and normal physiological function of skin and  mucous membranes. Outcome: Ongoing  Note:   Pt at risk for skin breakdown. Pt able to turn self. Pt repositioned every 2 hours and as needed. Heels raised off the bed.  Will continue to monitor for any new s/s of breakdown

## 2019-05-08 NOTE — PROGRESS NOTES
Progress Note  PGY-1    Hospital Day: 2                                                         Code:DNR-CCA  Admit Date: 5/4/2019                                             PCP: Verito Mabry MD             CC:  shortness of breath                    SUBJECTIVE:     Interval Hx:   No acute events overnight. Pt seen and examined at bedside. Has mild sob, better last night. Endorses come generalized pain but seems better than yesterday. Still having intermittent tremors. MEDICATIONS:   Scheduled Meds:   chlorthalidone  25 mg Oral Daily    furosemide  20 mg Intravenous BID    NIFEdipine  60 mg Oral BID    NIFEdipine  30 mg Oral Once    budesonide  0.5 mg Nebulization BID    formoterol  20 mcg Nebulization BID    ipratropium-albuterol  1 ampule Inhalation TID    levothyroxine  112 mcg Oral Daily    losartan  100 mg Oral Daily    rosuvastatin  10 mg Oral Nightly    sertraline  50 mg Oral Daily    sodium chloride flush  10 mL Intravenous 2 times per day      Continuous Infusions:  PRN Meds:sodium chloride flush, ondansetron, senna, acetaminophen    Allergies: Allergies   Allergen Reactions    Penicillins Hives    Sulfa Antibiotics        PHYSICAL EXAM:       Vitals: BP (!) 175/81   Pulse 96   Temp 98.2 °F (36.8 °C) (Oral)   Resp 20   Ht 5' 4\" (1.626 m)   Wt 143 lb 4.8 oz (65 kg)   SpO2 94%   BMI 24.60 kg/m²     I/O:      Intake/Output Summary (Last 24 hours) at 5/8/2019 1004  Last data filed at 5/8/2019 0916  Gross per 24 hour   Intake 480 ml   Output 2000 ml   Net -1520 ml     I/O this shift:  In: -   Out: 500 [Urine:500]  I/O last 3 completed shifts:   In: 598 [P.O.:598]  Out: 1500 [Urine:1500]    General: Alert and Oriented, no acute distress  Eye: PERRL, Normal Conjunctiva  HENT: Normocephalic  Neck: Supple, Non-tender  Respiratory: distant/deminished breath sounds, Non-labored respirations, BS equal, symmetrical expansion, no chest wall tenderness  Cardiovascular: Normal rate, regular rhythm, no murmurs, no gallops, good pulses in all extremities, normal peripheral perfusion, no edema  Gastrointestinal: Soft, significantly tender to palpation in RUQ/RLQ, non-distended, normal bowel sound  Musculoskeletal: Normal ROM, Normal strength  Neurologic: CN II-XII grossly intact, tremors present at rest and amplified with intention  Psychiatric: Cooperative, normal affect    DATA:       Labs:  CBC:   Recent Labs     05/07/19  0757 05/08/19  0700   WBC 7.4 7.7   HGB 8.4* 8.7*   HCT 25.6* 26.6*    245       BMP:   Recent Labs     05/06/19  1516 05/07/19  0757 05/08/19  0700   NA  --  144 142   K 3.3* 3.0* 3.6   CL  --  109 109   CO2  --  22 20*   BUN  --  26* 23*   CREATININE  --  2.0* 2.0*   GLUCOSE  --  102* 103*     LFT's:   No results for input(s): AST, ALT, ALB, BILITOT, ALKPHOS in the last 72 hours. Troponin:   No results for input(s): TROPONINI in the last 72 hours. BNP: No results for input(s): BNP in the last 72 hours. ABGs: No results for input(s): PHART, GQR9GIP, PO2ART in the last 72 hours. INR: No results for input(s): INR in the last 72 hours. U/A:No results for input(s): NITRITE, COLORU, PHUR, LABCAST, WBCUA, RBCUA, MUCUS, TRICHOMONAS, YEAST, BACTERIA, CLARITYU, SPECGRAV, LEUKOCYTESUR, UROBILINOGEN, BILIRUBINUR, BLOODU, GLUCOSEU, AMORPHOUS in the last 72 hours. Invalid input(s): KETONESU    VL Extremity Venous Bilateral   Final Result      US RENAL COMPLETE   Final Result      1. Negative. CT HEAD WO CONTRAST   Final Result   Impression:   1. No evidence of recent intracranial hemorrhage. 2. Left frontal meningioma slightly increased in size from 12/15/2009. 3. Parenchymal volume loss and white matter signal changes nonspecific but most likely chronic small vessel ischemic changes. 4. 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 XR CHEST PORTABLE   Final Result      No focal consolidation.       Mild cardiomegaly without pulmonary vascular congestion. CT ABDOMEN WO CONTRAST Additional Contrast? Oral    (Results Pending)         ASSESSMENT AND PLAN:   Guaanko Mann is a 75 y.o. female who presented with worsening dyspnea and bilateral leg edema.      Fluid overload, multifactorial - does not require supplemental oxygen, has not had events of acute de-sating, on exam breath sounds diminished/distant. Edema present in legs. Has membranous nephropathy as well as spinal deformity, all likely contributing.  - ECHO showed LVED 84-45%, normal diastolic fxn. Pulmonary hypertension present with PASP of 52 mmHg  - Nephrology consulted, renal ultrasound negative. On 20 IV lasix BID, will switch to PO  - pulmonology consulted, will get low dose screening CT for pulm nodules as outpt as well as PFTs. Signed off, will f/u outpatient    Abdominal pain, unclear etiology - Less tender than yesterday. mildly tender to palpation. Still having loose BMs. Unlikely to be infectious etiology. - f/u CT abd/pelvis w oral contrast  -positive hemoccult studies in ER    CKD stage IV - membranous nephropathy diagnosed by biopsy  - renal US negative  - Cr baseline 1.7-2.0   - Started Lasix 20 mg IV BID yesterday, had good UOP, 1.5L/24h    Anemia -TSH checked 5/3, within normal limits. Cortisol, B12, folate normal  - continue Venofer for iron deficiency. - GI consulted, will need EGD/colonoscopy but is refusing    Meningioma, stable - incraesed in size (by 2 mm) since prior imaging in 2009, Neurosurgery consulted, no further inpatient workup needed. Follow up outpatient    HTN  - nephrology consult  - home losartan 100, nifedipine 30mg daily     Headache - patient with history of migraines.  CT head-no acute cranial pathology   - Tylenol     Depression/anxiety  - continue home sertraline     Hypothyroidism  - home levothyroxine     HLD  - home rosuvastatin     CODE: DNR-CCA  FEN: renal diet  PPX: SCDs; no DVT chemoprophylaxis given positive hemoccult testing  DISPO: wards     Will discuss with Dr. Warren Feliz and medical team    rAeli Ospina MD PGY-1  5/8/2019,  10:04 AM       Patient seen and examined, plan of care discussed with residents. Agree with their assessment and plan with following addendum:  Patient reports her breathing was better yesterday but not good again today. She agreed to get CT A/P. Will cont with diuresis today. Add chlorthalidone for BP. Above note modified to reflect diagnosis of CKD stage IV. NEGRITA ruled out.      Zac Golden

## 2019-05-08 NOTE — PROGRESS NOTES
Pt admitted through the ED on 5/4/19. Pt was oriented to room, call light, rounding protocol, room service. Fall risk protocol reviewed, bracelet and socks remain in place. Tele leads in place. Assessment as charted. Monitor needs with hourly rounding.

## 2019-05-08 NOTE — PROGRESS NOTES
[facility]    [facility] [employer]              [phone]                       Plan :     Continue current treatment  Also add chlorthalidone    Assessment :     NEGRITA on CKD IV  Cr peaked to 2- remains there  But her baseline appears to be between 1.7- 2 for some time now  She is known to have CKD IV-    Known to have membranous nephropathy, hypertension, and took NSAID's in    the past  Cr 1.7 form 1/2017  She used to see Dr Jose Jones, and last saw on 3/5/13, and then started seeing  Me on 3/6/18- and last visit was on 4/18, Then saw  Dr Ivy Erickson at [facility] on    8/18    Renal imaging:   Renal biopsy: 2009- [place]  No urology  Echo: 60-65%- Grade I DD, mild LVH, Normal EF from 2017    Hypertension   BP: (151-175)/(79-99)  Pulse:  [90-96]    BP very high  losartan    On nifedipine  Started lasix on 5/7/19  Add chlorthalidone on 5/8/19    Anemia of CKD  Ferritin - 60  Iron sat is 25  Start venofer on 5/8/19    Migraines  Has meningioma- seen by neurosurgery,plan for outpatient follow up  Has tremor  Former smoker    SOB  Not sure if she's better  Got a dose of lasix,making urine  Echo : 5/19- EF 55- 60%, mild MR, mild TR   mild to mod concentric LVH  Has smoking history- plan for outpatient PFT  Xray- possible vascular congestion          [facility] would like to thank Erickson Cortes MD   for opportunity to serve this patient      Please call with questions at-   24 Hrs Answering service [phone] or  7 am- 5 pm via Perfect serve or cell phone  Dr.Sudhir Berny Calvin      CC/reason for consult :     NEGRITA     HPI :     From consult note-     Tor Townsend is a 75 y.o. female presented to   the hospital on 5/4/2019 with shortness of   Breath. Complains of headache, depression,  Lethargy. She was seen by PCP yesterday and was told   To go to the hospital.    She is known to have CKD, seen by Dr Sunshine Sierra   In the hospital multiple times. She didn't follow  Up as advised.   We are consulted for CKD and related issues. Interval History:     Sob getting better  Feeling better    ROS:     Seen with- no family  SOB- none  Edema- none  Nausea/vomiting- none  Poor appetite-No  Confusion- no  Urinary complaints- no  Any other complaints- no  All other ROS are reviewed and are Negative       Medication:     Scheduled Meds:   chlorthalidone  25 mg Oral Daily    furosemide  20 mg Intravenous BID    NIFEdipine  60 mg Oral BID    NIFEdipine  30 mg Oral Once    budesonide  0.5 mg Nebulization BID    formoterol  20 mcg Nebulization BID    ipratropium-albuterol  1 ampule Inhalation TID    levothyroxine  112 mcg Oral Daily    losartan  100 mg Oral Daily    rosuvastatin  10 mg Oral Nightly    sertraline  50 mg Oral Daily    sodium chloride flush  10 mL Intravenous 2 times per day     Continuous Infusions:  PRN Meds:.sodium chloride flush, ondansetron, senna, acetaminophen       Vitals :     Vitals:    05/08/19 1425   BP:    Pulse:    Resp:    Temp:    SpO2: 94%       I & O :       Intake/Output Summary (Last 24 hours) at 5/8/2019 1503  Last data filed at 5/8/2019 1439  Gross per 24 hour   Intake 480 ml   Output 2200 ml   Net -1720 ml        Physical Examination :     General appearance: Anxious- no, distressed- no, in good spirits- no    lethargic  HEENT: Lips- normal, teeth- ok , oral mucosa- moist  Neck : Mass- no, appears symmetrical, JVD- not visible  Respiratory: Respiratory effort-  Normal at rest, no oxygen, wheeze- no, crackles - no  Cardiovascular:  Ausculation- No M/R/G, Edema 1 +  Abdomen: visible mass- no, distention- no, scar- no, tenderness- no                            hepatosplenomegaly-  no  Musculoskeletal:  clubbing no,cyanosis- no , digital ischemia- no                           muscle strength- grossly normal , tone - grossly normal  Skin: rashes- no , ulcers- no, induration- no, tightening - no  Psychiatric:  Judgement and insight- normal           AAO X 3     LABS:     Recent Labs 05/07/19 0757 05/08/19  0700   WBC 7.4 7.7   HGB 8.4* 8.7*   HCT 25.6* 26.6*    245     Recent Labs     05/06/19  1516 05/07/19 0757 05/08/19  0700   NA  --  144 142   K 3.3* 3.0* 3.6   CL  --  109 109   CO2  --  22 20*   BUN  --  26* 23*   CREATININE  --  2.0* 2.0*   GLUCOSE  --  102* 103*   MG  --  1.80  --    PHOS  --  2.9 2.3*

## 2019-05-09 NOTE — CARE COORDINATION
CTC attempted to meet with the Pt to complete the Inpatient Interview. Pt is with therapy at this time and CTC will continue to follow and attempt to see the Pt at a later time. Sheala V. Ina Dandy, BSN, RN  Care Transition Coordinator  Contact Number:  [phone]
Case Management Daily Note:           Current Plan of Care: SOB        PT AM-PAC: ordered  / pending    OT AM-PAC: ordered pending      DME needs: N/A        Discharge Plan: From Home      Tentative Discharge Date: TBD      Current Barriers to Discharge: Medical Clearance    Testing pending : CT ABDOMEN WO CONTRAST       Resources/Information given: N/A         Case Management Notes: CM following for d/c planning , CM rounded this MA with RN Manager Piyush Rojo at bedside and pt was out of room for testing , CM spoke with Dr Val Stevenson  And anticipates d/c Wed. SW CM  To follow up when in room . GI consulted : Recommend EGD and colonoscopy, patient does not want to do while here and is refusing    Nephrology  Consulted: Start lasix 40 mg iv daily, likely should be enough  And also will need po from tomorrow   Cr peaked to 2  Starting venofer for iron deficiency    Pulmonary consulted :  Noted and signed  Off , pt will follow up ou Tressa cruz  RN Case Manager  [facility]  [address]   [facility] [address] [phone]  Fax [phone]
[facility] order noted, all docs needed have been faxed to [facility] for home care services.       Angel Wilkerson  Work mobile: [phone]  [facility] office: [phone]
sj Melissa and her family were provided with choice of provider; she and her family are in agreement with the discharge plan. CM  Met with pt  At  Bedside this AM  With RN manger  To confirm  D/c plan and  D/C plan d/w  ELY Alcazar  . Care Transitions patient:  Yes    King Ascencio, RN  [facility]  Case Management Department  Ph: [phone]

## 2019-05-09 NOTE — DISCHARGE SUMMARY
INTERNAL MEDICINE DEPARTMENT AT [address] [place] [address]  DISCHARGE SUMMARY    Patient ID: Davie Polanco                                             Discharge Date: 5/9/2019   Patient's PCP: Monster De MD                                          Discharge Physician: Seble Woodward MD  Admit Date: 5/4/2019   Admitting Physician: Mary Eli MD    DISCHARGE DIAGNOSES:  1- Dyspnea, multifactorial, improved  2- CKD stage IV, stable. NEGRITA ruled out    Chronic, Stable Medical Conditions, POA:  1. Anemia  2. Meningioma, stable  3. HTN  4. Hypothyroidism  5. Depression/Anxiety  6. HLD    Hospital Course:  Dvaie Polanco is a 75 y.o. female with a h/o CKD 4, HLD, HTN, COPD, Acquired hypothyroidism p/w progressive shortness of breath for several weeks. Also having poor energy and fatigue. Initermittent swelling of legs, ankles, left breast. No fevers, no chills. Poor appetite. Not voiding well. Started having nausea and diarrhea this morning. Also having headaches which are similar to migraines she has had in the past. No recent travel, recent sick contacts, recent changes to her diet. In the past few months, although not in the last 4-6 weeks, she had thoughts of death and not wanting to be alive but denies active suicidal ideation or formation of a suicide plan. She reports she has never had a colonoscopy. On admission she was not suicidal or depressed. Pulmonary was consulted and started on inhalers. Nephrology was consulted and started on diuretics including lasix and chlorthalidone with good response. Neurosurgery was consulted for evaluation of meningioma which was deemed stable. On the date of discharge, the patient reported feeling stable. The patient was found to not be in any acute distress, with vital signs within normal limits, and no new abnormalities on physical examination.  Further, the patient expressed appropriate understanding of, and agreement with, the discharge recommendations, medications, and plan.    Physical Exam:  BP (!) 160/71   Pulse 96   Temp 98.2 °F (36.8 °C) (Oral)   Resp 20   Ht 5' 4\" (1.626 m)   Wt 140 lb 10.5 oz (63.8 kg)   SpO2 95%   BMI 24.14 kg/m²   General appearance: alert, appears stated age and cooperative  Head: Normocephalic, without obvious abnormality, atraumatic  Eyes: conjunctivae/corneas clear. PERRL, EOM's intact. Fundi benign. Ears: normal TM's and external ear canals both ears  Nose: Nares normal. Septum midline. Mucosa normal. No drainage or sinus tenderness.   Throat: lips, mucosa, and tongue normal; teeth and gums normal  Neck: no adenopathy, no carotid bruit, no JVD, supple, symmetrical, trachea midline and thyroid not enlarged, symmetric, no tenderness/mass/nodules  Lungs: clear to auscultation bilaterally  Heart: regular rate and rhythm, S1, S2 normal, no murmur, click, rub or gallop  Abdomen: soft, non-tender; bowel sounds normal; no masses,  no organomegaly  Extremities: extremities normal, atraumatic, no cyanosis or edema  Neurologic: Grossly normal    Consults: pulmonary/intensive care and nephrology, neurosurgery  Disposition: long term care facility  Discharged Condition: Stable  Follow Up: Primary Care Physician in one week    DISCHARGE MEDICATION:     Medication List      START taking these medications    albuterol-ipratropium  MCG/ACT Aers inhaler  Commonly known as:  COMBIVENT RESPIMAT  Inhale 1 puff into the lungs every 8 hours     budesonide-formoterol 160-4.5 MCG/ACT Aero  Commonly known as:  SYMBICORT  Inhale 2 puffs into the lungs 2 times daily     chlorthalidone 25 MG tablet  Commonly known as:  HYGROTON  Take 1 tablet by mouth daily  Start taking on:  5/10/2019     ferrous sulfate 325 (65 Fe) MG tablet  Take 1 tablet by mouth daily (with breakfast)     furosemide 40 MG tablet  Commonly known as:  LASIX  Take 1 tablet by mouth daily     losartan 100 MG tablet  Commonly known as:  COZAAR  Take 1 tablet by mouth daily  Start taking on: 5/10/2019     potassium chloride 10 MEQ extended release tablet  Commonly known as:  KLOR-CON M  Take 2 tablets by mouth daily as needed (Take only with Lasix)     senna 8.6 MG tablet  Commonly known as:  SENOKOT  Take 1 tablet by mouth daily as needed (Constipation)        CHANGE how you take these medications    NIFEdipine 60 MG extended release tablet  Commonly known as:  ADALAT CC  Take 1 tablet by mouth 2 times daily  What changed:    · medication strength  · how much to take  · when to take this     sertraline 50 MG tablet  Commonly known as:  ZOLOFT  Take 1 tablet by mouth daily  Start taking on:  5/10/2019  What changed:    · medication strength  · when to take this        CONTINUE taking these medications    aspirin 81 MG tablet     levothyroxine 112 MCG tablet  Commonly known as:  SYNTHROID     PROAIR  (90 Base) MCG/ACT inhaler  Generic drug:  albuterol sulfate HFA     rosuvastatin 10 MG tablet  Commonly known as:  CRESTOR           Where to Get Your Medications      You can get these medications from any pharmacy    Bring a paper prescription for each of these medications  · albuterol-ipratropium  MCG/ACT Aers inhaler  · budesonide-formoterol 160-4.5 MCG/ACT Aero  · chlorthalidone 25 MG tablet  · ferrous sulfate 325 (65 Fe) MG tablet  · furosemide 40 MG tablet  · losartan 100 MG tablet  · NIFEdipine 60 MG extended release tablet  · potassium chloride 10 MEQ extended release tablet  · senna 8.6 MG tablet  · sertraline 50 MG tablet       Activity: activity as tolerated  Diet: regular diet  Wound Care: none needed    Time Spent on discharge is more than 30 minutes    Signed:  Yeyo Segal MD   Internal Medicine, PGY1  [phone]  5/9/2019        Patient seen and examined, plan of care discussed with residents. Agree with their assessment and plan with following addendum:  Briefly, patient was admitted with multiple complaints, dyspnea, weakness, abd pain, diarrhea.    She was treated for acute CHF and much improved. She declined EGD/colonoscopy. Discharged on BP meds and diuretics. She will follow up with GI as outpatient if she changes her mind. Home care set up. Time spent on discharge more than 30 minutes. Above note was modified to reflect changed to discharge diagnosis of CKD stage IV, NEGRITA ruled out.        Abdirahman Richardson

## 2019-05-09 NOTE — DISCHARGE INSTR - COC
Continuity of Care Form    Patient Name: Yanira Méndez   :  1945  MRN:  [id number]    Admit date:  2019  Discharge date:  ***    Code Status Order: DNR-CCA   Advance Directives:   Advance Care Flowsheet Documentation     Date/Time Healthcare Directive Type of Healthcare Directive Copy in [address] Agent's Name Healthcare Agent's Phone Number    19 0516  Yes, patient has an advance directive for healthcare treatment  --  No, copy requested from [facility] power of   Lisa Crews  [phone]          Admitting Physician:  Precious Singh MD  PCP: Eva Jenkins MD    Discharging Nurse: [facility] Unit/Room#: 1006/2490-64  Discharging Unit Phone Number: ***    Emergency Contact:   Extended Emergency Contact Information  Primary Emergency Contact: Bret, [address] [place] [address] Phone: [phone]  Relation: Other   needed? No    Past Surgical History:  History reviewed. No pertinent surgical history.     Immunization History:   Immunization History   Administered Date(s) Administered    Tdap (Boostrix, Adacel) 2016       Active Problems:  Patient Active Problem List   Diagnosis Code    Shortness of breath R06.02       Isolation/Infection:   Isolation          No Isolation            Nurse Assessment:  Last Vital Signs: BP (!) 160/71   Pulse 96   Temp 98.2 °F (36.8 °C) (Oral)   Resp 20   Ht 5' 4\" (1.626 m)   Wt 140 lb 10.5 oz (63.8 kg)   SpO2 95%   BMI 24.14 kg/m²     Last documented pain score (0-10 scale): Pain Level: 3  Last Weight:   Wt Readings from Last 1 Encounters:   19 140 lb 10.5 oz (63.8 kg)     Mental Status:  {IP PT MENTAL STATUS:76093}    IV Access:  [facility] IV ACCESS:[id number]}    Nursing Mobility/ADLs:  Walking   {CHP DME LZQR:769940022}  Transfer  {CHP DME VIBX:560078108}  Bathing  {CHP DME VDOR:871477757}  Dressing  {CHP DME EUCN:242676421}  Toileting  {CHP DME DGLX:510017494}  Feeding  {CHP DME

## 2019-05-09 NOTE — PROGRESS NOTES
D: c/o some cramping in feet bi and legs. K+ 3.6 & Magnesium 1.8 on 5/7. Potassium 10meq given as scheduled. Some YOO amb to BR with stand by assist and walker. A; Cont to monitor during hourly rounds. Will send MD message about c/o cramping.    7699 R: voiding q1-2hrs/post void bladder scan 275ml. Sent MD perfect serve asking for something for bladder tone & U/a & urine cx.  Cont to monitor during hourly rounds

## 2019-05-09 NOTE — PROGRESS NOTES
Pt has d/c order. IV and tele removed. Paperwork complete. prescriptions called into pt's pharmacy and will be picked up on her way home. Pt waiting on her ride.

## 2019-05-09 NOTE — PROGRESS NOTES
[facility]    [facility]              [phone]                       Plan :     Renal  Function better  Labs stable  BP coming down  Legs cramping- mg and K is normal  Switch to po diuretics    Assessment :     NEGRITA on CKD IV  Cr peaked to 2- and now 1.9  But her baseline appears to be between 1.7- 2 for some time now  She is known to have CKD IV-    Known to have membranous nephropathy, hypertension, and took NSAID's in    the past  Cr 1.7 form 1/2017  She used to see Dr Kirk Alonzo, and last saw on 3/5/13, and then started seeing  Me on 3/6/18- and last visit was on 4/18, Then saw  Dr Lauren Ngo at [facility] on    8/18    Renal imaging:   Renal biopsy: 2009- [place]  No urology  Echo: 60-65%- Grade I DD, mild LVH, Normal EF from 2017    Hypertension   BP: (154)/(80-82)  Pulse:  [91-93]    BP very high  losartan    On nifedipine  Started lasix on 5/7/19  Added chlorthalidone on 5/8/19  Now improving    Anemia of CKD  Ferritin - 60  Iron sat is 25  Started venofer on 5/8/19    Migraines  Has meningioma- seen by neurosurgery,plan for outpatient follow up  Has tremor  Former smoker    SOB  Not sure if she's better  Got a dose of lasix,making urine  Echo : 5/19- EF 55- 60%, mild MR, mild TR   mild to mod concentric LVH  Has smoking history- plan for outpatient PFT  Xray- possible vascular congestion          [facility] would like to thank Monse Yañez MD   for opportunity to serve this patient      Please call with questions at-   24 Hrs Answering service [phone] or  7 am- 5 pm via Perfect serve or cell phone  Dr.Sudhir Butch Manrique      CC/reason for consult :     NEGRITA     HPI :     From consult note-     Dempsey Dakin is a 75 y.o. female presented to   the hospital on 5/4/2019 with shortness of   Breath. Complains of headache, depression,  Lethargy.    She was seen by PCP yesterday and was told   To go to the hospital.    She is known to have CKD, seen by Dr Chemo Ornelas   In the hospital multiple

## 2019-05-10 NOTE — CARE COORDINATION
G[facility] Transitions Initial Follow Up Call    Call within 2 business days of discharge: Yes    Patient: Kaye Park Patient : 1945   MRN: [id number]   Reason for Admission: SOB   Discharge Date: 19 RARS: Readmission Risk Score: 16      Last Discharge [facility]       Complaint Diagnosis Description Type Department Provider    19 Shortness of Breath SOB (shortness of breath) . .. ED to Hosp-Admission (Discharged) (ADMITTED) [facility] 6 MAXIME Boyce MD; Svetlana Rivers... Spoke with: [address] [place] [address] Drive: [facility] YAZUO     Non-face-to-face services provided:  Obtained and reviewed discharge summary and/or continuity of care documents  Education of patient/family/caregiver/guardian to support self-management-. Assessment and support for treatment adherence and medication management-.    Care Transitions 24 Hour Call    Do you have any ongoing symptoms?:  Yes  Patient-reported symptoms:  Shortness of Breath  Do you have a copy of your discharge instructions?:  Yes  Do you have all of your prescriptions and are they filled?:  Yes  Have you been contacted by a [address]?:  No  Have you scheduled your follow up appointment?:  Yes  How are you going to get to your appointment?:  Car - family or friend to transport  Were you discharged with any Home Care or Post Acute Services:  No  Do you feel like you have everything you need to keep you well at home?:  Yes  Care Transitions Interventions  No Identified Needs       Summary  CTC spoke with patient this afternoon for 1 time follow up CTC call, patient no longer with [facility]) PCP. Patient states she is doing well, denies any complaints of nausea, vomiting, fevers, chills, chest pain or Cough. Patient does still have some SOB, but states it is lot's better at this time.   Patient instructed to continue to monitor for s/s of breathing difficulty, report to ED or Urgent Care immediately, drink plenty of fluids, rest as needed, and eat at least 3 meals per day. CTC and Pt reviewed meds and CTC completed the 1111f, all medications filled and in home at this time. Patient has follow up appointment scheduled with pulmonologist for 06/14/2019. CTC advised Pt of use of urgent care or physicians 24 hr access line if assistance is needed after hours or CTC weekend. CTC provided education on s/s that require medical attention and when to seek medical attention.      Follow Up  Future Appointments   Date Time Provider Marie Briggs   6/14/2019 10:40 AM MD Lilliam Gatica P/CC MARCOS Gonzalez RN

## 2019-09-11 NOTE — PROGRESS NOTES
Occupational Therapy  Facility/Department: [address] [facility] [address]  Daily Treatment Note  NAME: Jasmina Jiménez  : 1945  MRN: [id number]    Date of Service: 2019    Discharge Recommendations:  Jasmina Jiménez scored a 19/24 on the AM-PAC ADL Inpatient form. Current research shows that an AM-PAC score of 18 or greater is typically associated with a discharge to the patient's home setting. Based on the patients AM-PAC score and their current ADL deficits, it is recommended that the patient have 2-3 sessions per week of Occupational Therapy at d/c to increase the patients independence. HOME HEALTH CARE: LEVEL 1 STANDARD    - Initial home health evaluation to occur within 24-48 hours, in patient home   - Therapy to evaluate with goal of regaining prior level of functioning   - Therapy to evaluate if patient has [address] needs for personal care       OT Equipment Recommendations  Equipment Needed: No    Assessment   Performance deficits / Impairments: Decreased functional mobility ; Decreased ADL status; Decreased endurance  Assessment: Pt stated she was having a rough day but agreed to get out of bed for a sponge bath with encouragement. Pt able to tolerate 2 trials (3 min x1, 4 min x1) of standing at sink to complete sponge bath but required rest break in between. Pt is functioning below baseline with significant fatigue upon exertion. Pt required CGA for all mobility, transfers and ADLs with the use of RW. Pt would benefit from continued OT services. Cont per POC. Treatment Diagnosis: Impaired ADLs and functional mobility  Prognosis: Good  Patient Education: Role of OT, importance of OOB activity, pursed lip breathing- pt verbalized understanding  REQUIRES OT FOLLOW UP: Yes  Activity Tolerance  Activity Tolerance: Patient limited by fatigue  Activity Tolerance: Pt required rest break during sponge bath standing at sink.  Pt stood for 3 minutes than sat for about 2 minutes to catch breath using pursed at sink; pt occasionally holding onto sink with one hand for stability)  Standing Balance  Time: ~3 min X1, ~4 min X1  Activity: functional mobility to/from bathroom; sponge bathing at sink  Functional Mobility  Functional - Mobility Device: Rolling Walker  Activity: To/from bathroom  Assist Level: Contact guard assistance  Bed mobility  Supine to Sit: Stand by assistance(HOB elevated)  Scooting: Stand by assistance  Transfers  Sit to stand: Contact guard assistance(From bed X3)  Stand to sit: Contact guard assistance(To bed X2, to chair)                       Cognition  Overall Cognitive Status: [facility]                                         Plan   Plan  Times per week: 2-5x  Times per day: Daily  Current Treatment Recommendations: Strengthening, Safety Education & Training, Self-Care / ADL, Functional Mobility Training, Endurance Training    AM-PAC Score        AM-[facility] Inpatient Daily Activity Raw Score: 19  AM-PAC Inpatient ADL T-Scale Score : 40.22  ADL Inpatient CMS 0-100% Score: 42.8  ADL Inpatient CMS G-Code Modifier : CK    Goals  Short term goals  Time Frame for Short term goals: By d/c  Short term goal 1: to increase standing tolerance to 3 min to complete grooming task (MET- 5/8) revised: increase standing tolerance to 5 minutes to complete grooming  Short term goal 2: verbalize 2 energy conservation techniques with less than 2 vc (not met 5/8)  Short term goal 3: assess toilet transfer (not met 5/8)  Short term goal 4: LB dressing with SBA (not met 5/8)  Patient Goals   Patient goals : To return home       Therapy Time   Individual Concurrent Group Co-treatment   Time In 0587         Time Out 1530         Minutes 39           Time coded treatment minutes: 39    Total Treatment time: [address] [place] [address]    If pt is discharged prior to next treatment, this note to serve as discharge summary.      Mary Baca OTR/L #[id number]  Therapist was present, directed the patient's care, made skilled judgement, and was responsible for assessment and treatment of the patient. DISPLAY PLAN FREE TEXT